# Patient Record
Sex: FEMALE | Race: WHITE | Employment: PART TIME | URBAN - METROPOLITAN AREA
[De-identification: names, ages, dates, MRNs, and addresses within clinical notes are randomized per-mention and may not be internally consistent; named-entity substitution may affect disease eponyms.]

---

## 2017-03-20 ENCOUNTER — HOSPITAL ENCOUNTER (OUTPATIENT)
Dept: VASCULAR LAB | Age: 47
Discharge: HOME | End: 2017-03-20
Attending: FAMILY MEDICINE | Admitting: FAMILY MEDICINE

## 2017-03-20 DIAGNOSIS — M79.89 OTHER SPECIFIED SOFT TISSUE DISORDERS: ICD-10-CM

## 2017-03-28 ENCOUNTER — HOSPITAL ENCOUNTER (OUTPATIENT)
Dept: MAMMOGRAPHY | Age: 47
Discharge: HOME | End: 2017-03-28
Attending: NURSE PRACTITIONER | Admitting: NURSE PRACTITIONER

## 2017-03-28 DIAGNOSIS — Z12.31 ENCOUNTER FOR SCREENING MAMMOGRAM FOR BREAST CANCER: ICD-10-CM

## 2017-08-17 ENCOUNTER — OFFICE VISIT (OUTPATIENT)
Dept: PULMONOLOGY | Age: 47
End: 2017-08-17

## 2017-08-17 VITALS
DIASTOLIC BLOOD PRESSURE: 74 MMHG | HEART RATE: 72 BPM | OXYGEN SATURATION: 97 % | HEIGHT: 65 IN | SYSTOLIC BLOOD PRESSURE: 124 MMHG | BODY MASS INDEX: 48.82 KG/M2 | WEIGHT: 293 LBS

## 2017-08-17 DIAGNOSIS — G47.33 OSA (OBSTRUCTIVE SLEEP APNEA): Primary | ICD-10-CM

## 2017-08-17 PROCEDURE — 99203 OFFICE O/P NEW LOW 30 MIN: CPT | Performed by: INTERNAL MEDICINE

## 2017-08-17 RX ORDER — DIMENHYDRINATE 50 MG
100 TABLET ORAL DAILY
COMMUNITY

## 2017-08-17 RX ORDER — ALBUTEROL SULFATE 90 UG/1
1-2 AEROSOL, METERED RESPIRATORY (INHALATION)
COMMUNITY
Start: 2017-05-01

## 2017-08-17 ASSESSMENT — SLEEP AND FATIGUE QUESTIONNAIRES
HOW LIKELY ARE YOU TO NOD OFF OR FALL ASLEEP WHEN YOU ARE A PASSENGER IN A CAR FOR AN HOUR WITHOUT A BREAK: 1
HOW LIKELY ARE YOU TO NOD OFF OR FALL ASLEEP IN A CAR, WHILE STOPPED FOR A FEW MINUTES IN TRAFFIC: 1
ESS TOTAL SCORE: 18
HOW LIKELY ARE YOU TO NOD OFF OR FALL ASLEEP WHILE SITTING QUIETLY AFTER LUNCH WITHOUT ALCOHOL: 3
HOW LIKELY ARE YOU TO NOD OFF OR FALL ASLEEP WHILE SITTING AND READING: 3
HOW LIKELY ARE YOU TO NOD OFF OR FALL ASLEEP WHILE WATCHING TV: 3
HOW LIKELY ARE YOU TO NOD OFF OR FALL ASLEEP WHILE SITTING INACTIVE IN A PUBLIC PLACE: 3
HOW LIKELY ARE YOU TO NOD OFF OR FALL ASLEEP WHILE LYING DOWN TO REST IN THE AFTERNOON WHEN CIRCUMSTANCES PERMIT: 3
NECK CIRCUMFERENCE (INCHES): 19
HOW LIKELY ARE YOU TO NOD OFF OR FALL ASLEEP WHILE SITTING AND TALKING TO SOMEONE: 1

## 2017-09-23 ENCOUNTER — HOSPITAL ENCOUNTER (OUTPATIENT)
Dept: OTHER | Age: 47
Discharge: HOME | End: 2017-09-23
Attending: NURSE PRACTITIONER | Admitting: NURSE PRACTITIONER

## 2017-09-23 LAB
A/G RATIO: 0.9 (ref 1.1–2.2)
ALBUMIN SERPL-MCNC: 3.8 G/DL (ref 3.4–5)
ALP BLD-CCNC: 85 U/L (ref 40–129)
ALT SERPL-CCNC: 33 U/L (ref 10–40)
ANION GAP SERPL CALCULATED.3IONS-SCNC: 13 MMOL/L (ref 3–16)
AST SERPL-CCNC: 43 U/L (ref 15–37)
BASOPHILS ABSOLUTE: 0.1 K/UL (ref 0–0.2)
BASOPHILS RELATIVE PERCENT: 0.7 %
BILIRUB SERPL-MCNC: 0.4 MG/DL (ref 0–1)
BUN BLDV-MCNC: 16 MG/DL (ref 7–20)
CALCIUM SERPL-MCNC: 9.7 MG/DL (ref 8.3–10.6)
CHLORIDE BLD-SCNC: 100 MMOL/L (ref 99–110)
CHOLESTEROL, FASTING: 133 MG/DL (ref 0–199)
CO2: 26 MMOL/L (ref 21–32)
CREAT SERPL-MCNC: 0.6 MG/DL (ref 0.6–1.1)
EOSINOPHILS ABSOLUTE: 0.2 K/UL (ref 0–0.6)
EOSINOPHILS RELATIVE PERCENT: 1.8 %
GFR AFRICAN AMERICAN: >60
GFR NON-AFRICAN AMERICAN: >60
GLOBULIN: 4.4 G/DL
GLUCOSE FASTING: 156 MG/DL (ref 70–99)
HCT VFR BLD CALC: 42.7 % (ref 36–48)
HDLC SERPL-MCNC: 37 MG/DL (ref 40–60)
HEMOGLOBIN: 13.8 G/DL (ref 12–16)
LDL CHOLESTEROL CALCULATED: 58 MG/DL
LYMPHOCYTES ABSOLUTE: 2.5 K/UL (ref 1–5.1)
LYMPHOCYTES RELATIVE PERCENT: 26.3 %
MCH RBC QN AUTO: 28.9 PG (ref 26–34)
MCHC RBC AUTO-ENTMCNC: 32.3 G/DL (ref 31–36)
MCV RBC AUTO: 89.5 FL (ref 80–100)
MONOCYTES ABSOLUTE: 0.6 K/UL (ref 0–1.3)
MONOCYTES RELATIVE PERCENT: 5.9 %
NEUTROPHILS ABSOLUTE: 6.3 K/UL (ref 1.7–7.7)
NEUTROPHILS RELATIVE PERCENT: 65.3 %
PDW BLD-RTO: 17.3 % (ref 12.4–15.4)
PLATELET # BLD: 263 K/UL (ref 135–450)
PMV BLD AUTO: 9.3 FL (ref 5–10.5)
POTASSIUM SERPL-SCNC: 5.1 MMOL/L (ref 3.5–5.1)
RBC # BLD: 4.77 M/UL (ref 4–5.2)
SODIUM BLD-SCNC: 139 MMOL/L (ref 136–145)
TOTAL PROTEIN: 8.2 G/DL (ref 6.4–8.2)
TRIGLYCERIDE, FASTING: 192 MG/DL (ref 0–150)
TSH REFLEX: 0.48 UIU/ML (ref 0.27–4.2)
VLDLC SERPL CALC-MCNC: 38 MG/DL
WBC # BLD: 9.6 K/UL (ref 4–11)

## 2017-09-24 LAB
ESTIMATED AVERAGE GLUCOSE: 142.7 MG/DL
HBA1C MFR BLD: 6.6 %

## 2017-10-09 ENCOUNTER — HOSPITAL ENCOUNTER (OUTPATIENT)
Dept: SLEEP MEDICINE | Age: 47
Discharge: HOME | End: 2017-10-10
Attending: INTERNAL MEDICINE | Admitting: INTERNAL MEDICINE

## 2017-10-09 DIAGNOSIS — G47.33 OSA (OBSTRUCTIVE SLEEP APNEA): ICD-10-CM

## 2017-10-09 DIAGNOSIS — G47.33 OSA (OBSTRUCTIVE SLEEP APNEA): Primary | ICD-10-CM

## 2017-12-01 ENCOUNTER — OFFICE VISIT (OUTPATIENT)
Dept: PULMONOLOGY | Age: 47
End: 2017-12-01

## 2017-12-01 VITALS
WEIGHT: 293 LBS | DIASTOLIC BLOOD PRESSURE: 80 MMHG | TEMPERATURE: 98.2 F | RESPIRATION RATE: 20 BRPM | SYSTOLIC BLOOD PRESSURE: 124 MMHG | BODY MASS INDEX: 48.82 KG/M2 | OXYGEN SATURATION: 98 % | HEART RATE: 72 BPM | HEIGHT: 65 IN

## 2017-12-01 DIAGNOSIS — E66.01 MORBID OBESITY WITH BMI OF 50.0-59.9, ADULT (HCC): ICD-10-CM

## 2017-12-01 DIAGNOSIS — G47.33 OSA (OBSTRUCTIVE SLEEP APNEA): Primary | ICD-10-CM

## 2017-12-01 DIAGNOSIS — G47.10 HYPERSOMNIA: ICD-10-CM

## 2017-12-01 PROCEDURE — 99213 OFFICE O/P EST LOW 20 MIN: CPT | Performed by: NURSE PRACTITIONER

## 2017-12-01 RX ORDER — DILTIAZEM HYDROCHLORIDE 60 MG/1
TABLET, FILM COATED ORAL
COMMUNITY
Start: 2017-09-26 | End: 2018-10-01 | Stop reason: ALTCHOICE

## 2017-12-01 ASSESSMENT — SLEEP AND FATIGUE QUESTIONNAIRES
HOW LIKELY ARE YOU TO NOD OFF OR FALL ASLEEP WHILE LYING DOWN TO REST IN THE AFTERNOON WHEN CIRCUMSTANCES PERMIT: 3
ESS TOTAL SCORE: 10
HOW LIKELY ARE YOU TO NOD OFF OR FALL ASLEEP WHILE SITTING INACTIVE IN A PUBLIC PLACE: 3
HOW LIKELY ARE YOU TO NOD OFF OR FALL ASLEEP WHILE SITTING AND READING: 1
HOW LIKELY ARE YOU TO NOD OFF OR FALL ASLEEP WHEN YOU ARE A PASSENGER IN A CAR FOR AN HOUR WITHOUT A BREAK: 0
HOW LIKELY ARE YOU TO NOD OFF OR FALL ASLEEP WHILE SITTING QUIETLY AFTER LUNCH WITHOUT ALCOHOL: 1
HOW LIKELY ARE YOU TO NOD OFF OR FALL ASLEEP WHILE SITTING AND TALKING TO SOMEONE: 0
NECK CIRCUMFERENCE (INCHES): 19
HOW LIKELY ARE YOU TO NOD OFF OR FALL ASLEEP WHILE WATCHING TV: 2
HOW LIKELY ARE YOU TO NOD OFF OR FALL ASLEEP IN A CAR, WHILE STOPPED FOR A FEW MINUTES IN TRAFFIC: 0

## 2017-12-01 NOTE — PATIENT INSTRUCTIONS
Uncomfortable bedding can prevent good sleep. Ensure your bedroom is quiet and comfortable. A cooler room along with enough blankets to stay warm is recommended. If your room is too noisy, try a white noise machine. If too bright, try black out shades or an eye mask. Dont take worries to bed. Leave worries about work, school etc. behind you when you go to bed. Some people find it helpful to assign a worry period in the evening or late afternoon to write down your worries and get them out of your system. CPAP Equipment Cleaning and Disinfecting Schedule  Equipment Cleaning Frequency Instructions  Disinfecting Frequency   Non-Disposable Filters  Weekly Mild soapy water, Rinse, Air Dry Not Required   Disposable Filters Change as needed  2-4 weeks Do Not Wash Not Required   Hose/tubing Daily Mild soapy water, Rinse, Air Dry Once a week   Mask / Nasal Pillows Daily Mild soapy water, Rinse, Air Dry Once a week   Headgear Weekly Hand wash, Mild soapy water, Rinse, Dry  Not Required   Humidifier Daily Empty water daily  Mild soapy water, Rinse well, Air Dry  Once a week   CPAP Unit As Needed Dust with damp cloth,  No detergents or sprays Not Required         Disinfect (per schedule) with 1 part white vinegar and 3 parts water- soak mask and water chamber for 30 minutes every 1-2 weeks, more often if sick. Allow water/vinegar mixture to run through tubing. Allow all equipment to air dry. Drying Hints:   Always hang tubing away from direct sunlight, as this will cause the tubing to become yellow, brittle and crack over a period of time. DO NOT attach the wet tubing to your CPAP unit to blow-dry it. The moisture from the tubing can drain back into your machine. Moisture in your unit can cause sudden pressure increases or short circuits  DO's and DON'Ts:  - Don't use alcohol-based products to clean your mask, because it can cause the materials to become hard and brittle.    - Don't put headgear in the washer or dryer  - Don't use any caustic or household cleaning solutions such as bleach on your CPAP   equipment.  - Do follow the recommended cleaning schedule. - Do change your disposable filter frequently. Adapted From: [email or web address]. shtm.   These are general suggestions for all models please follow specific s recommendations and specific instructions

## 2018-06-15 ENCOUNTER — OFFICE VISIT (OUTPATIENT)
Dept: ORTHOPEDIC SURGERY | Age: 48
End: 2018-06-15

## 2018-06-15 VITALS
BODY MASS INDEX: 48.82 KG/M2 | SYSTOLIC BLOOD PRESSURE: 117 MMHG | HEART RATE: 67 BPM | DIASTOLIC BLOOD PRESSURE: 67 MMHG | HEIGHT: 65 IN | WEIGHT: 293 LBS

## 2018-06-15 DIAGNOSIS — M25.512 ACUTE PAIN OF LEFT SHOULDER: Primary | ICD-10-CM

## 2018-06-15 PROCEDURE — 99202 OFFICE O/P NEW SF 15 MIN: CPT | Performed by: ORTHOPAEDIC SURGERY

## 2018-06-15 RX ORDER — ATENOLOL 25 MG/1
TABLET ORAL
COMMUNITY
Start: 2018-03-31 | End: 2018-10-01 | Stop reason: ALTCHOICE

## 2018-06-15 RX ORDER — LISINOPRIL 10 MG/1
TABLET ORAL
COMMUNITY
Start: 2018-03-31 | End: 2018-10-01 | Stop reason: ALTCHOICE

## 2018-06-15 RX ORDER — CEPHALEXIN 500 MG/1
CAPSULE ORAL
COMMUNITY
Start: 2018-04-11 | End: 2018-10-01 | Stop reason: ALTCHOICE

## 2018-06-15 RX ORDER — BUDESONIDE AND FORMOTEROL FUMARATE DIHYDRATE 80; 4.5 UG/1; UG/1
AEROSOL RESPIRATORY (INHALATION)
COMMUNITY
Start: 2018-02-27

## 2018-07-02 ENCOUNTER — OFFICE VISIT (OUTPATIENT)
Dept: ORTHOPEDIC SURGERY | Age: 48
End: 2018-07-02

## 2018-07-02 VITALS
BODY MASS INDEX: 48.82 KG/M2 | HEART RATE: 96 BPM | SYSTOLIC BLOOD PRESSURE: 102 MMHG | WEIGHT: 293 LBS | HEIGHT: 65 IN | DIASTOLIC BLOOD PRESSURE: 75 MMHG

## 2018-07-02 DIAGNOSIS — M75.112 INCOMPLETE TEAR OF LEFT ROTATOR CUFF: Primary | ICD-10-CM

## 2018-07-02 PROCEDURE — 99213 OFFICE O/P EST LOW 20 MIN: CPT | Performed by: ORTHOPAEDIC SURGERY

## 2018-07-02 NOTE — PROGRESS NOTES
History  Hailee Nicholson is a 50 y.o. female who returns for follow-up of her left shoulder MRI study. Symptoms are is unchanged. Pain level today is 5/10. Treatment to date includes diagnostic MRI. Patient's medications, allergies, past medical, surgical, social and family histories were reviewed and updated as appropriate. Review of Systems  Relevant review of systems reviewed and available in the patient's chart    Primary Area of CC: Patient has a difficult time with active abduction of the shoulder due to pain in the subacromial space. She also demonstrated some signs of apprehension concern was raised possibility of a labral pathology as well. MRIs been performed here to review the results    Examination proximal and distal to the injured area show good overall ROM, no bony prominence or soft tissue tenderness, no instability or excessive stiffness. Radiology:     MRI of the left shoulder shows severe coalescing tendinopathy of the supraspinatus tendon with an evolving concealed interstitial tear at the anterior footprint measuring 1.2 x 1.2 cm. Mild insertional tendinosis of the infraspinatus and subscapularis tendon with moderate tendinopathy of the proximal biceps tendon without full-thickness tearing or retraction is present no abnormalities seen in the labrum. Impression   Diagnosis Orders   1. Incomplete tear of left rotator cuff                Plan  Patient remains symptomatic is probably a candidate for a medical rotation flap to treat the incomplete tear of the supraspinatus. I'll refer to Dr. Alonzo Esquivel for further evaluation and treatment recommendations.     [place]

## 2018-07-16 ENCOUNTER — OFFICE VISIT (OUTPATIENT)
Dept: ORTHOPEDIC SURGERY | Age: 48
End: 2018-07-16

## 2018-07-16 VITALS
HEART RATE: 78 BPM | HEIGHT: 65 IN | BODY MASS INDEX: 48.82 KG/M2 | DIASTOLIC BLOOD PRESSURE: 78 MMHG | WEIGHT: 293 LBS | SYSTOLIC BLOOD PRESSURE: 132 MMHG

## 2018-07-16 DIAGNOSIS — M75.122 COMPLETE TEAR OF LEFT ROTATOR CUFF: Primary | ICD-10-CM

## 2018-07-16 PROBLEM — S46.812A TRAPEZIUS MUSCLE STRAIN, LEFT, INITIAL ENCOUNTER: Status: ACTIVE | Noted: 2018-07-16

## 2018-07-16 PROCEDURE — 99214 OFFICE O/P EST MOD 30 MIN: CPT | Performed by: ORTHOPAEDIC SURGERY

## 2018-07-16 PROCEDURE — L3670 SO ACRO/CLAV CAN WEB PRE OTS: HCPCS | Performed by: ORTHOPAEDIC SURGERY

## 2018-07-16 RX ORDER — DICLOFENAC SODIUM 75 MG/1
TABLET, DELAYED RELEASE ORAL
Status: ON HOLD | COMMUNITY
Start: 2018-06-23 | End: 2018-10-03 | Stop reason: HOSPADM

## 2018-07-16 RX ORDER — CLINDAMYCIN HYDROCHLORIDE 300 MG/1
CAPSULE ORAL
COMMUNITY
Start: 2018-06-25 | End: 2018-10-01 | Stop reason: ALTCHOICE

## 2018-08-22 ENCOUNTER — TELEPHONE (OUTPATIENT)
Dept: ORTHOPEDIC SURGERY | Age: 48
End: 2018-08-22

## 2018-08-24 ENCOUNTER — HOSPITAL ENCOUNTER (OUTPATIENT)
Age: 48
Discharge: HOME | End: 2018-08-24
Payer: COMMERCIAL

## 2018-08-24 LAB
A/G RATIO: 1 (ref 1.1–2.2)
ALBUMIN SERPL-MCNC: 3.9 G/DL (ref 3.4–5)
ALP BLD-CCNC: 76 U/L (ref 40–129)
ALT SERPL-CCNC: 24 U/L (ref 10–40)
ANION GAP SERPL CALCULATED.3IONS-SCNC: 15 MMOL/L (ref 3–16)
APTT: 32.7 SEC (ref 26–36)
AST SERPL-CCNC: 30 U/L (ref 15–37)
BASOPHILS ABSOLUTE: 0.1 K/UL (ref 0–0.2)
BASOPHILS RELATIVE PERCENT: 0.7 %
BILIRUB SERPL-MCNC: 0.5 MG/DL (ref 0–1)
BUN BLDV-MCNC: 15 MG/DL (ref 7–20)
CALCIUM SERPL-MCNC: 9.8 MG/DL (ref 8.3–10.6)
CHLORIDE BLD-SCNC: 100 MMOL/L (ref 99–110)
CHOLESTEROL, TOTAL: 133 MG/DL (ref 0–199)
CO2: 25 MMOL/L (ref 21–32)
CREAT SERPL-MCNC: 0.6 MG/DL (ref 0.6–1.1)
EOSINOPHILS ABSOLUTE: 0.1 K/UL (ref 0–0.6)
EOSINOPHILS RELATIVE PERCENT: 1.5 %
GFR AFRICAN AMERICAN: >60
GFR NON-AFRICAN AMERICAN: >60
GLOBULIN: 4 G/DL
GLUCOSE BLD-MCNC: 104 MG/DL (ref 70–99)
HCT VFR BLD CALC: 39.2 % (ref 36–48)
HDLC SERPL-MCNC: 45 MG/DL (ref 40–60)
HEMOGLOBIN: 12.8 G/DL (ref 12–16)
INR BLD: 1.06 (ref 0.86–1.14)
LDL CHOLESTEROL CALCULATED: 54 MG/DL
LYMPHOCYTES ABSOLUTE: 2.8 K/UL (ref 1–5.1)
LYMPHOCYTES RELATIVE PERCENT: 28.8 %
MCH RBC QN AUTO: 28.4 PG (ref 26–34)
MCHC RBC AUTO-ENTMCNC: 32.6 G/DL (ref 31–36)
MCV RBC AUTO: 87.3 FL (ref 80–100)
MONOCYTES ABSOLUTE: 0.5 K/UL (ref 0–1.3)
MONOCYTES RELATIVE PERCENT: 5.3 %
NEUTROPHILS ABSOLUTE: 6.1 K/UL (ref 1.7–7.7)
NEUTROPHILS RELATIVE PERCENT: 63.7 %
PDW BLD-RTO: 16.2 % (ref 12.4–15.4)
PLATELET # BLD: 273 K/UL (ref 135–450)
PMV BLD AUTO: 8.5 FL (ref 5–10.5)
POTASSIUM SERPL-SCNC: 5.1 MMOL/L (ref 3.5–5.1)
PROTHROMBIN TIME: 12.1 SEC (ref 9.8–13)
RBC # BLD: 4.49 M/UL (ref 4–5.2)
SODIUM BLD-SCNC: 140 MMOL/L (ref 136–145)
TOTAL PROTEIN: 7.9 G/DL (ref 6.4–8.2)
TRIGL SERPL-MCNC: 171 MG/DL (ref 0–150)
VLDLC SERPL CALC-MCNC: 34 MG/DL
WBC # BLD: 9.6 K/UL (ref 4–11)

## 2018-08-24 PROCEDURE — 80053 COMPREHEN METABOLIC PANEL: CPT

## 2018-08-24 PROCEDURE — 85610 PROTHROMBIN TIME: CPT

## 2018-08-24 PROCEDURE — 83036 HEMOGLOBIN GLYCOSYLATED A1C: CPT

## 2018-08-24 PROCEDURE — 85730 THROMBOPLASTIN TIME PARTIAL: CPT

## 2018-08-24 PROCEDURE — 36415 COLL VENOUS BLD VENIPUNCTURE: CPT

## 2018-08-24 PROCEDURE — 80061 LIPID PANEL: CPT

## 2018-08-24 PROCEDURE — 85025 COMPLETE CBC W/AUTO DIFF WBC: CPT

## 2018-08-25 LAB
ESTIMATED AVERAGE GLUCOSE: 145.6 MG/DL
HBA1C MFR BLD: 6.7 %

## 2018-09-25 ENCOUNTER — TELEPHONE (OUTPATIENT)
Dept: ORTHOPEDIC SURGERY | Age: 48
End: 2018-09-25

## 2018-10-02 ENCOUNTER — ANESTHESIA EVENT (OUTPATIENT)
Dept: OPERATING ROOM | Age: 48
End: 2018-10-02
Payer: COMMERCIAL

## 2018-10-03 ENCOUNTER — ANESTHESIA (OUTPATIENT)
Dept: OPERATING ROOM | Age: 48
End: 2018-10-03
Payer: COMMERCIAL

## 2018-10-03 ENCOUNTER — HOSPITAL ENCOUNTER (OUTPATIENT)
Age: 48
Setting detail: OUTPATIENT SURGERY
Discharge: HOME | End: 2018-10-03
Attending: ORTHOPAEDIC SURGERY | Admitting: ORTHOPAEDIC SURGERY
Payer: COMMERCIAL

## 2018-10-03 VITALS
BODY MASS INDEX: 48.82 KG/M2 | TEMPERATURE: 98.5 F | OXYGEN SATURATION: 93 % | HEART RATE: 80 BPM | WEIGHT: 293 LBS | DIASTOLIC BLOOD PRESSURE: 74 MMHG | HEIGHT: 65 IN | SYSTOLIC BLOOD PRESSURE: 117 MMHG | RESPIRATION RATE: 16 BRPM

## 2018-10-03 VITALS
OXYGEN SATURATION: 100 % | DIASTOLIC BLOOD PRESSURE: 81 MMHG | SYSTOLIC BLOOD PRESSURE: 105 MMHG | RESPIRATION RATE: 14 BRPM

## 2018-10-03 DIAGNOSIS — M75.112 INCOMPLETE TEAR OF LEFT ROTATOR CUFF: Primary | ICD-10-CM

## 2018-10-03 LAB
GLUCOSE BLD-MCNC: 174 MG/DL (ref 70–99)
GLUCOSE BLD-MCNC: 201 MG/DL (ref 70–99)
PERFORMED ON: ABNORMAL
PERFORMED ON: ABNORMAL

## 2018-10-03 PROCEDURE — 6360000002 HC RX W HCPCS: Performed by: ANESTHESIOLOGY

## 2018-10-03 PROCEDURE — 6360000002 HC RX W HCPCS: Performed by: ORTHOPAEDIC SURGERY

## 2018-10-03 PROCEDURE — 7100000010 HC PHASE II RECOVERY - FIRST 15 MIN: Performed by: ORTHOPAEDIC SURGERY

## 2018-10-03 PROCEDURE — 3700000000 HC ANESTHESIA ATTENDED CARE: Performed by: ORTHOPAEDIC SURGERY

## 2018-10-03 PROCEDURE — 3700000001 HC ADD 15 MINUTES (ANESTHESIA): Performed by: ORTHOPAEDIC SURGERY

## 2018-10-03 PROCEDURE — C1713 ANCHOR/SCREW BN/BN,TIS/BN: HCPCS | Performed by: ORTHOPAEDIC SURGERY

## 2018-10-03 PROCEDURE — 3600000004 HC SURGERY LEVEL 4 BASE: Performed by: ORTHOPAEDIC SURGERY

## 2018-10-03 PROCEDURE — 7100000011 HC PHASE II RECOVERY - ADDTL 15 MIN: Performed by: ORTHOPAEDIC SURGERY

## 2018-10-03 PROCEDURE — 7100000001 HC PACU RECOVERY - ADDTL 15 MIN: Performed by: ORTHOPAEDIC SURGERY

## 2018-10-03 PROCEDURE — 2580000003 HC RX 258: Performed by: ORTHOPAEDIC SURGERY

## 2018-10-03 PROCEDURE — C9290 INJ, BUPIVACAINE LIPOSOME: HCPCS | Performed by: ORTHOPAEDIC SURGERY

## 2018-10-03 PROCEDURE — 93010 ELECTROCARDIOGRAM REPORT: CPT | Performed by: INTERNAL MEDICINE

## 2018-10-03 PROCEDURE — 2580000003 HC RX 258: Performed by: ANESTHESIOLOGY

## 2018-10-03 PROCEDURE — 7100000000 HC PACU RECOVERY - FIRST 15 MIN: Performed by: ORTHOPAEDIC SURGERY

## 2018-10-03 PROCEDURE — 2500000003 HC RX 250 WO HCPCS: Performed by: NURSE ANESTHETIST, CERTIFIED REGISTERED

## 2018-10-03 PROCEDURE — 93005 ELECTROCARDIOGRAM TRACING: CPT | Performed by: ANESTHESIOLOGY

## 2018-10-03 PROCEDURE — 2709999900 HC NON-CHARGEABLE SUPPLY: Performed by: ORTHOPAEDIC SURGERY

## 2018-10-03 PROCEDURE — 2720000010 HC SURG SUPPLY STERILE: Performed by: ORTHOPAEDIC SURGERY

## 2018-10-03 PROCEDURE — 2500000003 HC RX 250 WO HCPCS: Performed by: ORTHOPAEDIC SURGERY

## 2018-10-03 PROCEDURE — 6370000000 HC RX 637 (ALT 250 FOR IP): Performed by: ANESTHESIOLOGY

## 2018-10-03 PROCEDURE — 2500000003 HC RX 250 WO HCPCS: Performed by: ANESTHESIOLOGY

## 2018-10-03 PROCEDURE — 6360000002 HC RX W HCPCS: Performed by: NURSE ANESTHETIST, CERTIFIED REGISTERED

## 2018-10-03 PROCEDURE — 3600000014 HC SURGERY LEVEL 4 ADDTL 15MIN: Performed by: ORTHOPAEDIC SURGERY

## 2018-10-03 DEVICE — IMPLANTABLE DEVICE: Type: IMPLANTABLE DEVICE | Site: SHOULDER | Status: FUNCTIONAL

## 2018-10-03 DEVICE — ANCHOR SUT DIA45MM BIOCRYL RAPIDE ABSRB 3 DYNACORD SZ 2: Type: IMPLANTABLE DEVICE | Site: SHOULDER | Status: FUNCTIONAL

## 2018-10-03 DEVICE — SCREW BNE L18MM DIA4MM PERIARTC S STL ST LOK FULL THRD FOR: Type: IMPLANTABLE DEVICE | Site: SHOULDER | Status: FUNCTIONAL

## 2018-10-03 RX ORDER — OXYCODONE HYDROCHLORIDE 5 MG/1
5 TABLET ORAL ONCE
Status: COMPLETED | OUTPATIENT
Start: 2018-10-03 | End: 2018-10-03

## 2018-10-03 RX ORDER — BUPIVACAINE HYDROCHLORIDE 5 MG/ML
INJECTION, SOLUTION PERINEURAL PRN
Status: DISCONTINUED | OUTPATIENT
Start: 2018-10-03 | End: 2018-10-03 | Stop reason: HOSPADM

## 2018-10-03 RX ORDER — 0.9 % SODIUM CHLORIDE 0.9 %
INTRAVENOUS SOLUTION INTRAVENOUS CONTINUOUS PRN
Status: DISCONTINUED | OUTPATIENT
Start: 2018-10-03 | End: 2018-10-03 | Stop reason: HOSPADM

## 2018-10-03 RX ORDER — FENTANYL CITRATE 50 UG/ML
INJECTION, SOLUTION INTRAMUSCULAR; INTRAVENOUS PRN
Status: DISCONTINUED | OUTPATIENT
Start: 2018-10-03 | End: 2018-10-03 | Stop reason: SDUPTHER

## 2018-10-03 RX ORDER — SUCCINYLCHOLINE CHLORIDE 20 MG/ML
INJECTION INTRAMUSCULAR; INTRAVENOUS PRN
Status: DISCONTINUED | OUTPATIENT
Start: 2018-10-03 | End: 2018-10-03 | Stop reason: SDUPTHER

## 2018-10-03 RX ORDER — SODIUM CHLORIDE 0.9 % (FLUSH) 0.9 %
SYRINGE (ML) INJECTION
Status: DISCONTINUED
Start: 2018-10-03 | End: 2018-10-03 | Stop reason: HOSPADM

## 2018-10-03 RX ORDER — LIDOCAINE HYDROCHLORIDE 10 MG/ML
2 INJECTION, SOLUTION INFILTRATION; PERINEURAL
Status: COMPLETED | OUTPATIENT
Start: 2018-10-03 | End: 2018-10-03

## 2018-10-03 RX ORDER — ACETAMINOPHEN 10 MG/ML
1000 INJECTION, SOLUTION INTRAVENOUS ONCE
Status: COMPLETED | OUTPATIENT
Start: 2018-10-03 | End: 2018-10-03

## 2018-10-03 RX ORDER — MAGNESIUM HYDROXIDE 1200 MG/15ML
LIQUID ORAL CONTINUOUS PRN
Status: DISCONTINUED | OUTPATIENT
Start: 2018-10-03 | End: 2018-10-03 | Stop reason: HOSPADM

## 2018-10-03 RX ORDER — GLYCOPYRROLATE 0.2 MG/ML
INJECTION INTRAMUSCULAR; INTRAVENOUS PRN
Status: DISCONTINUED | OUTPATIENT
Start: 2018-10-03 | End: 2018-10-03 | Stop reason: SDUPTHER

## 2018-10-03 RX ORDER — SODIUM CHLORIDE, SODIUM LACTATE, POTASSIUM CHLORIDE, CALCIUM CHLORIDE 600; 310; 30; 20 MG/100ML; MG/100ML; MG/100ML; MG/100ML
INJECTION, SOLUTION INTRAVENOUS CONTINUOUS
Status: DISCONTINUED | OUTPATIENT
Start: 2018-10-03 | End: 2018-10-03 | Stop reason: HOSPADM

## 2018-10-03 RX ORDER — EPHEDRINE SULFATE 50 MG/ML
INJECTION INTRAVENOUS PRN
Status: DISCONTINUED | OUTPATIENT
Start: 2018-10-03 | End: 2018-10-03 | Stop reason: SDUPTHER

## 2018-10-03 RX ORDER — LABETALOL HYDROCHLORIDE 5 MG/ML
INJECTION, SOLUTION INTRAVENOUS PRN
Status: DISCONTINUED | OUTPATIENT
Start: 2018-10-03 | End: 2018-10-03 | Stop reason: SDUPTHER

## 2018-10-03 RX ORDER — ONDANSETRON 2 MG/ML
4 INJECTION INTRAMUSCULAR; INTRAVENOUS
Status: COMPLETED | OUTPATIENT
Start: 2018-10-03 | End: 2018-10-03

## 2018-10-03 RX ORDER — PROMETHAZINE HYDROCHLORIDE 25 MG/ML
6.25 INJECTION, SOLUTION INTRAMUSCULAR; INTRAVENOUS
Status: DISCONTINUED | OUTPATIENT
Start: 2018-10-03 | End: 2018-10-03 | Stop reason: HOSPADM

## 2018-10-03 RX ORDER — PROPOFOL 10 MG/ML
INJECTION, EMULSION INTRAVENOUS PRN
Status: DISCONTINUED | OUTPATIENT
Start: 2018-10-03 | End: 2018-10-03 | Stop reason: SDUPTHER

## 2018-10-03 RX ORDER — OXYCODONE HYDROCHLORIDE AND ACETAMINOPHEN 5; 325 MG/1; MG/1
1 TABLET ORAL EVERY 6 HOURS PRN
Qty: 28 TABLET | Refills: 0 | Status: SHIPPED | OUTPATIENT
Start: 2018-10-03 | End: 2018-10-10

## 2018-10-03 RX ORDER — HYDROMORPHONE HCL 110MG/55ML
0.5 PATIENT CONTROLLED ANALGESIA SYRINGE INTRAVENOUS EVERY 5 MIN PRN
Status: DISCONTINUED | OUTPATIENT
Start: 2018-10-03 | End: 2018-10-03 | Stop reason: HOSPADM

## 2018-10-03 RX ORDER — ROCURONIUM BROMIDE 10 MG/ML
INJECTION, SOLUTION INTRAVENOUS PRN
Status: DISCONTINUED | OUTPATIENT
Start: 2018-10-03 | End: 2018-10-03 | Stop reason: SDUPTHER

## 2018-10-03 RX ORDER — HYDROMORPHONE HCL 110MG/55ML
0.25 PATIENT CONTROLLED ANALGESIA SYRINGE INTRAVENOUS EVERY 5 MIN PRN
Status: DISCONTINUED | OUTPATIENT
Start: 2018-10-03 | End: 2018-10-03 | Stop reason: HOSPADM

## 2018-10-03 RX ADMIN — Medication 3 G: at 07:32

## 2018-10-03 RX ADMIN — ONDANSETRON 4 MG: 2 INJECTION INTRAMUSCULAR; INTRAVENOUS at 11:25

## 2018-10-03 RX ADMIN — LABETALOL HYDROCHLORIDE 5 MG: 5 INJECTION INTRAVENOUS at 08:44

## 2018-10-03 RX ADMIN — HYDROMORPHONE HYDROCHLORIDE 0.5 MG: 2 INJECTION INTRAMUSCULAR; INTRAVENOUS; SUBCUTANEOUS at 10:07

## 2018-10-03 RX ADMIN — FENTANYL CITRATE 150 MCG: 50 INJECTION, SOLUTION INTRAMUSCULAR; INTRAVENOUS at 08:23

## 2018-10-03 RX ADMIN — SUGAMMADEX 200 MG: 100 INJECTION, SOLUTION INTRAVENOUS at 09:43

## 2018-10-03 RX ADMIN — OXYCODONE HYDROCHLORIDE 5 MG: 5 TABLET ORAL at 11:05

## 2018-10-03 RX ADMIN — HYDROMORPHONE HYDROCHLORIDE 0.5 MG: 2 INJECTION INTRAMUSCULAR; INTRAVENOUS; SUBCUTANEOUS at 10:27

## 2018-10-03 RX ADMIN — LIDOCAINE HYDROCHLORIDE 2 ML: 10 INJECTION, SOLUTION EPIDURAL; INFILTRATION; INTRACAUDAL; PERINEURAL at 06:57

## 2018-10-03 RX ADMIN — FENTANYL CITRATE 100 MCG: 50 INJECTION, SOLUTION INTRAMUSCULAR; INTRAVENOUS at 08:46

## 2018-10-03 RX ADMIN — LABETALOL HYDROCHLORIDE 5 MG: 5 INJECTION INTRAVENOUS at 08:50

## 2018-10-03 RX ADMIN — SUCCINYLCHOLINE CHLORIDE 160 MG: 20 INJECTION, SOLUTION INTRAMUSCULAR; INTRAVENOUS at 07:58

## 2018-10-03 RX ADMIN — LABETALOL HYDROCHLORIDE 5 MG: 5 INJECTION INTRAVENOUS at 08:36

## 2018-10-03 RX ADMIN — EPHEDRINE SULFATE 15 MG: 50 INJECTION INTRAVENOUS at 08:03

## 2018-10-03 RX ADMIN — SODIUM CHLORIDE, POTASSIUM CHLORIDE, SODIUM LACTATE AND CALCIUM CHLORIDE: 600; 310; 30; 20 INJECTION, SOLUTION INTRAVENOUS at 06:56

## 2018-10-03 RX ADMIN — FENTANYL CITRATE 50 MCG: 50 INJECTION, SOLUTION INTRAMUSCULAR; INTRAVENOUS at 09:19

## 2018-10-03 RX ADMIN — HYDROMORPHONE HYDROCHLORIDE 0.5 MG: 2 INJECTION INTRAMUSCULAR; INTRAVENOUS; SUBCUTANEOUS at 10:19

## 2018-10-03 RX ADMIN — FENTANYL CITRATE 100 MCG: 50 INJECTION, SOLUTION INTRAMUSCULAR; INTRAVENOUS at 08:50

## 2018-10-03 RX ADMIN — FENTANYL CITRATE 150 MCG: 50 INJECTION, SOLUTION INTRAMUSCULAR; INTRAVENOUS at 07:55

## 2018-10-03 RX ADMIN — FENTANYL CITRATE 100 MCG: 50 INJECTION, SOLUTION INTRAMUSCULAR; INTRAVENOUS at 08:00

## 2018-10-03 RX ADMIN — PROPOFOL 200 MG: 10 INJECTION, EMULSION INTRAVENOUS at 07:58

## 2018-10-03 RX ADMIN — GLYCOPYRROLATE 0.2 MG: 0.2 INJECTION, SOLUTION INTRAMUSCULAR; INTRAVENOUS at 07:55

## 2018-10-03 RX ADMIN — ACETAMINOPHEN 1000 MG: 10 INJECTION, SOLUTION INTRAVENOUS at 06:57

## 2018-10-03 RX ADMIN — FENTANYL CITRATE 100 MCG: 50 INJECTION, SOLUTION INTRAMUSCULAR; INTRAVENOUS at 08:13

## 2018-10-03 RX ADMIN — ROCURONIUM BROMIDE 50 MG: 10 INJECTION, SOLUTION INTRAVENOUS at 08:00

## 2018-10-03 ASSESSMENT — PULMONARY FUNCTION TESTS
PIF_VALUE: 29
PIF_VALUE: 28
PIF_VALUE: 23
PIF_VALUE: 32
PIF_VALUE: 22
PIF_VALUE: 20
PIF_VALUE: 32
PIF_VALUE: 20
PIF_VALUE: 25
PIF_VALUE: 0
PIF_VALUE: 20
PIF_VALUE: 27
PIF_VALUE: 29
PIF_VALUE: 33
PIF_VALUE: 32
PIF_VALUE: 24
PIF_VALUE: 30
PIF_VALUE: 1
PIF_VALUE: 28
PIF_VALUE: 20
PIF_VALUE: 22
PIF_VALUE: 24
PIF_VALUE: 20
PIF_VALUE: 30
PIF_VALUE: 25
PIF_VALUE: 30
PIF_VALUE: 28
PIF_VALUE: 32
PIF_VALUE: 24
PIF_VALUE: 23
PIF_VALUE: 5
PIF_VALUE: 21
PIF_VALUE: 28
PIF_VALUE: 36
PIF_VALUE: 25
PIF_VALUE: 22
PIF_VALUE: 28
PIF_VALUE: 35
PIF_VALUE: 27
PIF_VALUE: 28
PIF_VALUE: 23
PIF_VALUE: 27
PIF_VALUE: 25
PIF_VALUE: 23
PIF_VALUE: 27
PIF_VALUE: 26
PIF_VALUE: 13
PIF_VALUE: 28
PIF_VALUE: 20
PIF_VALUE: 20
PIF_VALUE: 29
PIF_VALUE: 35
PIF_VALUE: 28
PIF_VALUE: 32
PIF_VALUE: 5
PIF_VALUE: 21
PIF_VALUE: 38
PIF_VALUE: 25
PIF_VALUE: 22
PIF_VALUE: 30
PIF_VALUE: 32
PIF_VALUE: 12
PIF_VALUE: 25
PIF_VALUE: 35
PIF_VALUE: 27
PIF_VALUE: 32
PIF_VALUE: 34
PIF_VALUE: 25
PIF_VALUE: 41
PIF_VALUE: 34
PIF_VALUE: 25
PIF_VALUE: 41
PIF_VALUE: 28
PIF_VALUE: 25
PIF_VALUE: 36
PIF_VALUE: 27
PIF_VALUE: 35
PIF_VALUE: 2
PIF_VALUE: 0
PIF_VALUE: 34
PIF_VALUE: 25
PIF_VALUE: 28
PIF_VALUE: 22
PIF_VALUE: 22
PIF_VALUE: 24
PIF_VALUE: 22
PIF_VALUE: 32
PIF_VALUE: 3
PIF_VALUE: 0
PIF_VALUE: 41
PIF_VALUE: 33
PIF_VALUE: 29
PIF_VALUE: 3
PIF_VALUE: 0
PIF_VALUE: 1
PIF_VALUE: 22
PIF_VALUE: 11
PIF_VALUE: 30
PIF_VALUE: 28
PIF_VALUE: 25
PIF_VALUE: 34
PIF_VALUE: 1
PIF_VALUE: 26
PIF_VALUE: 34
PIF_VALUE: 2
PIF_VALUE: 1
PIF_VALUE: 29
PIF_VALUE: 32
PIF_VALUE: 29
PIF_VALUE: 30
PIF_VALUE: 32
PIF_VALUE: 28
PIF_VALUE: 24
PIF_VALUE: 23
PIF_VALUE: 25
PIF_VALUE: 27
PIF_VALUE: 33
PIF_VALUE: 7
PIF_VALUE: 27
PIF_VALUE: 25
PIF_VALUE: 22
PIF_VALUE: 28
PIF_VALUE: 33
PIF_VALUE: 32
PIF_VALUE: 28
PIF_VALUE: 31
PIF_VALUE: 26
PIF_VALUE: 29
PIF_VALUE: 25
PIF_VALUE: 28

## 2018-10-03 ASSESSMENT — ENCOUNTER SYMPTOMS: SHORTNESS OF BREATH: 1

## 2018-10-03 ASSESSMENT — PAIN DESCRIPTION - FREQUENCY: FREQUENCY: CONTINUOUS

## 2018-10-03 ASSESSMENT — PAIN DESCRIPTION - PAIN TYPE
TYPE: SURGICAL PAIN
TYPE: SURGICAL PAIN

## 2018-10-03 ASSESSMENT — PAIN DESCRIPTION - DESCRIPTORS
DESCRIPTORS: BURNING
DESCRIPTORS: CONSTANT;PRESSURE

## 2018-10-03 ASSESSMENT — PAIN SCALES - GENERAL
PAINLEVEL_OUTOF10: 4
PAINLEVEL_OUTOF10: 5
PAINLEVEL_OUTOF10: 7
PAINLEVEL_OUTOF10: 5
PAINLEVEL_OUTOF10: 5
PAINLEVEL_OUTOF10: 7
PAINLEVEL_OUTOF10: 7
PAINLEVEL_OUTOF10: 0

## 2018-10-03 ASSESSMENT — PAIN DESCRIPTION - LOCATION
LOCATION: SHOULDER
LOCATION: SHOULDER

## 2018-10-03 ASSESSMENT — PAIN DESCRIPTION - ORIENTATION
ORIENTATION: LEFT
ORIENTATION: LEFT

## 2018-10-03 ASSESSMENT — PAIN DESCRIPTION - ONSET: ONSET: ON-GOING

## 2018-10-03 ASSESSMENT — PAIN - FUNCTIONAL ASSESSMENT: PAIN_FUNCTIONAL_ASSESSMENT: 0-10

## 2018-10-03 NOTE — ANESTHESIA PRE PROCEDURE
Department of Anesthesiology  Preprocedure Note       Name:  Juju Beverly   Age:  46 y.o.  :  1970                                          MRN:  [id number]         Date:  10/3/2018      Surgeon: Hansa Evans):  Amari Reilly DO    Procedure: Procedure(s):  SHOULDER ARTHROSCOPY, ROTATOR CUFF REPAIR, POSSIBLE ROTATION MEDICAL GRAFT, SUBACROMIAL DECOMPRESSION, BICEP WORK AS NEEDED    Medications prior to admission:   Prior to Admission medications    Medication Sig Start Date End Date Taking? Authorizing Provider   aspirin 81 MG tablet Take 81 mg by mouth daily   Yes Historical Provider, MD   diclofenac (VOLTAREN) 75 MG EC tablet  18  Yes Historical Provider, MD   ONE TOUCH ULTRA TEST strip  18  Yes Historical Provider, MD   blood glucose test strips (ASCENSIA AUTODISC VI;ONE TOUCH ULTRA TEST VI) strip Check Blood glucose  Twice Daily 4/3/18  Yes Historical Provider, MD   budesonide-formoterol (SYMBICORT) 80-4.5 MCG/ACT AERO USE 2 INHALATIONS ORALLY   TWICE DAILY 18  Yes Historical Provider, MD   Probiotic Product (PROBIOTIC DAILY PO) Take 1 capsule by mouth daily   Yes Historical Provider, MD   Coenzyme Q10 (CO Q-10) 100 MG CAPS Take 100 mg by mouth daily   Yes Historical Provider, MD   OMEPRAZOLE PO Take 20 mg by mouth daily   Yes Historical Provider, MD   albuterol sulfate  (90 Base) MCG/ACT inhaler Inhale 1-2 puffs into the lungs 17  Yes Historical Provider, MD   lisinopril (PRINIVIL;ZESTRIL) 10 MG tablet Take 10 mg by mouth daily   Yes Historical Provider, MD   metFORMIN (GLUCOPHAGE) 1000 MG tablet Take 1,000 mg by mouth 2 times daily (with meals)   Yes Historical Provider, MD   montelukast (SINGULAIR) 10 MG tablet Take 10 mg by mouth nightly. Yes Historical Provider, MD   levocetirizine (XYZAL) 5 MG tablet Take 5 mg by mouth nightly. Yes Historical Provider, MD   atenolol (TENORMIN) 25 MG tablet Take 25 mg by mouth daily.    Yes Historical Provider, MD   Omega-3 Fatty Acids (FISH OIL) 1000 MG CAPS Take 6,000 mg by mouth daily. Yes Historical Provider, MD   Glucosamine-Chondroit-Vit C-Mn (GLUCOSAMINE CHONDR 500 COMPLEX PO) Take 1 tablet by mouth daily. Yes Historical Provider, MD   Pyridoxine HCl (VITAMIN B-6) 100 MG tablet Take 200 mg by mouth daily. Yes Historical Provider, MD   Ascorbic Acid (VITAMIN C) 250 MG tablet Take 1,000 mg by mouth daily    Yes Historical Provider, MD       Current medications:    Current Facility-Administered Medications   Medication Dose Route Frequency Provider Last Rate Last Dose    ceFAZolin (ANCEF) 3 g in dextrose 5 % 100 mL IVPB  3 g Intravenous Once Leti Gallardo DO        lidocaine 1 % injection 2 mL  2 mL Intradermal Once PRN Javan Crenshaw MD        lactated ringers infusion   Intravenous Continuous Javan Crenshaw MD        acetaminophen (OFIRMEV) infusion 1,000 mg  1,000 mg Intravenous Once Lalita Ahn, DO           Allergies:     Allergies   Allergen Reactions    Atorvastatin      Other reaction(s): Muscle Aches       Problem List:    Patient Active Problem List   Diagnosis Code    Ventral hernia with obstruction but no gangrene K43.6    Incomplete tear of left rotator cuff M75.112    Trapezius muscle strain, left, initial encounter C34.753H       Past Medical History:        Diagnosis Date    Acid reflux     Diabetes mellitus ([facility] [address]     Hyperlipidemia     Hypertension     KORY on CPAP        Past Surgical History:        Procedure Laterality Date    ARM SURGERY      L arm    CHOLECYSTECTOMY      HERNIA REPAIR      HYSTERECTOMY      OTHER SURGICAL HISTORY Left     surgery on left elbow    VENTRAL HERNIA REPAIR N/A 9/4/14    REPAIR OF INCARCERATED VENTRAL HERNIA       Social History:    Social History   Substance Use Topics    Smoking status: Former Smoker     Packs/day: 1.00     Years: 33.00     Quit date: 9/1/2014    Smokeless tobacco: Never Used    Alcohol use No

## 2018-10-03 NOTE — H&P
History and physical performed and in paper chart. I have reviewed with the patient and/or family the risks, benefits, and alternatives to the procedure.       Kadi Bustos,   10/3/2018

## 2018-10-03 NOTE — OP NOTE
Diagnostic Shoulder Arthroscopy with Rotator Cuff Repair     Savi Mays (1970)    [facility]#:  [id number]    Date of Surgery- 10/3/2018      Preoperative Diagnosis-  1. Rotator cuff tear left shoulder           2. Outlet impingement left shoulder                                                      Postoperative Diagnosis-  1. Rotator cuff tear left shoulder           2. Outlet impingement left shoulder           Procedure-   1. Diagnostic arthroscopy left shoulder (CPT 59438)            2. Arthroscopic rotator cuff repair left shoulder (TTI-91942)    3. Subacromial decompression (CPT- 95658)          Surgeon-  Mia Dobson DO    [facility] [address], [place]    Anesthesia- Scalene and General    EBL: minimal    Implants- Depuy Mitek Healix 4.5 mm, quantity # 1; Healix Knotless, quantity # 2    Indications for Operation  Persistent shoulder pain with an MRI confirmed rotator cuff tear. The patient chose to proceed with the aforementioned procedures. At no time were any guarantees implied or stated. Site Marking and Surgical Prep  The patient was seen in the holding area and the appropriate extremity marked with a pen. Interscalene block was administered by the anesthesia department. The patient was taken to the operative suite, identified, placed on the operating room table in the supine position. After induction of general anesthesia, the patient was placed in the beach chair position with all bony prominences adequately padded and the head and neck anatomically supported. Examination  Under Anesthesia. Full symmetric range of motion, no instability    Diagnostic Arthroscopy  The upper extremity from the neck to fingertips was prepped with Hibiclens and alcohol and draped in the standard fashion. The forearm was well padded and secured in the [employer] Spider extremity positioning device. A standard midglenoid posterior portal was established. The trocar and cannula were inserted. The trocar was removed and the arthroscope and inflow inserted. Systematic arthroscopy was performed and the findings are summarized below. Standard anterior portal was made with outside in technique. 1.  Superior Labrum/Biceps Tendon-  Intact biceps tendon. No evidence of labral fraying  2. Anterior/Posterior Labrum- Intact without fraying  3. Rotator Cuff- The subscapularis tendon was intact. There was a full thickness tear of the supraspinatus tendon  4. Inferior Axillary Recess-  No loose bodies  5. Articular Surfaces-  Intact    Intra-articular Debridement   None performed. Subacromial Decompression  The trocar and cannula were redirected to the subacromial space. The arthroscope and inflow were inserted. A lateral portal was established after localizing the subacromial space with a spinal needle. Using a both a shaver and a radiofrequency probe, the bursa and the tissue beneath the acromion were removed. The coracromial ligament was divided. A 5.5 mm bur was placed through the lateral portal and the anterior aspect of the acromion was removed. The arthroscope was then switched to the lateral portal and with the bur posteriorly, a smooth flat acromion was created. The arm was abducted to 90 degrees and internally and externally rotated. There was no evidence of outlet impingement. Rotator Cuff Repair  Hypertrophic bursa was debrided. The supraspinatus tendon tear was exposed and the configuration identified. The tendon was mobilized to achieve a tension free repair. The greater tuberosity was abraded to a bleeding bony surface. One medial row anchor was placed. Sutures were retrieved in a horizontal fashion using a retrograde technique. Suture limbs were isolated and tied using a [employer] knot with three half hitches. Two Healix Knotless anchors were place laterally to create a transosseus equivalent repair. The repair was under minimal tension.   The arm was abducted, internally

## 2018-10-04 LAB
EKG ATRIAL RATE: 76 BPM
EKG DIAGNOSIS: NORMAL
EKG P AXIS: 12 DEGREES
EKG P-R INTERVAL: 160 MS
EKG Q-T INTERVAL: 370 MS
EKG QRS DURATION: 86 MS
EKG QTC CALCULATION (BAZETT): 416 MS
EKG R AXIS: 55 DEGREES
EKG T AXIS: 47 DEGREES
EKG VENTRICULAR RATE: 76 BPM

## 2018-10-11 ENCOUNTER — HOSPITAL ENCOUNTER (OUTPATIENT)
Dept: PHYSICAL THERAPY | Age: 48
Setting detail: THERAPIES SERIES
Discharge: HOME | End: 2018-10-11
Payer: COMMERCIAL

## 2018-10-11 ENCOUNTER — OFFICE VISIT (OUTPATIENT)
Dept: ORTHOPEDIC SURGERY | Age: 48
End: 2018-10-11

## 2018-10-11 VITALS — HEIGHT: 65 IN | WEIGHT: 293 LBS | BODY MASS INDEX: 48.82 KG/M2

## 2018-10-11 DIAGNOSIS — Z98.890 S/P ROTATOR CUFF REPAIR: Primary | ICD-10-CM

## 2018-10-11 PROCEDURE — G8985 CARRY GOAL STATUS: HCPCS

## 2018-10-11 PROCEDURE — 99024 POSTOP FOLLOW-UP VISIT: CPT | Performed by: ORTHOPAEDIC SURGERY

## 2018-10-11 PROCEDURE — 97140 MANUAL THERAPY 1/> REGIONS: CPT

## 2018-10-11 PROCEDURE — 97161 PT EVAL LOW COMPLEX 20 MIN: CPT

## 2018-10-11 PROCEDURE — G8984 CARRY CURRENT STATUS: HCPCS

## 2018-10-11 PROCEDURE — 97110 THERAPEUTIC EXERCISES: CPT

## 2018-10-11 NOTE — FLOWSHEET NOTE
[address] [place] [address] and [facility]    Physical Therapy Daily Treatment Note  Date:  10/11/2018    Patient Name:  Nunzio Fleischer    :  1970  MRN: [id number]  Restrictions/Precautions:    Medical/Treatment Diagnosis Information:  · Diagnosis: Z98.890 s/p RTC repair, M25.512 L shoulder pain  · Treatment Diagnosis: M25.512 L shoulder pain, s/p L RTC repair   Insurance/Certification information:   [place]  Physician Information:  Referring Practitioner: Dr. Anna Del Rio of care signed (Y/N): routed    Date of Patient follow up with Physician:     G-Code (if applicable):      Date G-Code Applied:  10/11/18  PT G-Codes  Functional Assessment Tool Used: Quick DASH  Score: 48%  Functional Limitation: Carrying, moving and handling objects  Carrying, Moving and Handling Objects Current Status (): At least 40 percent but less than 60 percent impaired, limited or restricted  Carrying, Moving and Handling Objects Goal Status ():  At least 20 percent but less than 40 percent impaired, limited or restricted    Progress Note: [x]  Yes  []  No  Next due by: Visit #10       Latex Allergy:  [x]NO      []YES  Preferred Language for Healthcare:   [x]English       []other:    Visit # Insurance Allowable   1 30     Pain level:  3/10     SUBJECTIVE:  See eval    OBJECTIVE: See eval  Observation:    ABD pillow not donned appropriate, repositioned pillow  Test measurements:      RESTRICTIONS/PRECAUTIONS: PROM only x6 wks, ultrasling x3 wks, sling x3 wks    Exercises/Interventions:     Therapeutic Ex Sets/sec Reps Notes HEP   Shrugs 1 30  x   Scap squeeze 2 10  x   Elbow flex/ext 1 30  x   Ball squeeze   Throughout day x   Self assisted flex 5\" 10  x   Self assisted ER 5\" 10  x   UT S 10\" 5  x                                                           Pt edu: HEP, dx, POC, ice       Manual Intervention       PROM x8'                                         NMR re-education swelling/inflammation/restriction, improving soft tissue extensibility and allowing for proper ROM for normal function with self care, mobility, lifting and ambulation. Modalities: declined ice     Charges:  Timed Code Treatment Minutes: 30   Total Treatment Minutes: 45     [x] EVAL  [x] ZH(65579) x  1   [] IONTO  [] NMR (26864) x      [] VASO  [x] Manual (97411) x  1    [] Other:  [] TA x       [] Mech Traction (78952)  [] ES(attended) (25420)      [] ES (un) (14959):     GOALS:   Patient stated goal: reach above my head     Therapist goals for Patient:   Short Term Goals: To be achieved in: 2 weeks  1. Independent in HEP and progression per patient tolerance, in order to prevent re-injury. 2. Patient will have a decrease in pain to facilitate improvement in movement, function, and ADLs as indicated by Functional Deficits.     Long Term Goals: To be achieved in: 12 weeks  1. Disability index score of 20% or less for the DASH to assist with reaching prior level of function. 2. Patient will demonstrate increased AROM to WNL to allow for proper joint functioning as indicated by patients Functional Deficits. 3. Patient will demonstrate an increase in Strength to >4+/5 to allow for proper functional mobility as indicated by patients Functional Deficits. 4. Patient will return to all functional activities without increased symptoms or restriction. 5. Pt will D/C sling and exhibit normal use of UE(patient specific functional goal)        Progression Towards Functional goals:  [] Patient is progressing as expected towards functional goals listed. [] Progression is slowed due to complexities listed. [] Progression has been slowed due to co-morbidities.   [x] Plan just implemented, too soon to assess goals progression  [] Other:     ASSESSMENT:  See eval    Treatment/Activity Tolerance:  [x] Patient tolerated treatment well [] Patient limited by fatique  [] Patient limited by pain  [] Patient limited by

## 2018-10-11 NOTE — PLAN OF CARE
[facility], [address] [place] [address] [place], [address]  Phone: [phone]   Fax:     [phone]                                                       Physical Therapy Certification    Dear Referring Practitioner: Dr. Surya Alex,    We had the pleasure of evaluating the following patient for physical therapy services at [address] [place] [address]. A summary of our findings can be found in the initial assessment below. This includes our plan of care. If you have any questions or concerns regarding these findings, please do not hesitate to contact me at the office phone number checked above. Thank you for the referral.       Physician Signature:_______________________________Date:__________________  By signing above (or electronic signature), therapists plan is approved by physician      Patient: Dakotah Shirley   : 1970   MRN: [id number]  Referring Physician: Referring Practitioner: Dr. Surya Alex      Evaluation Date: 10/11/2018      Medical Diagnosis Information:  Diagnosis: Z98.890 s/p RTC repair, M25.512 L shoulder pain   Treatment Diagnosis: M25.512 L shoulder pain, s/p L RTC repair 10/3/18                                         Insurance information:  [place]    Precautions/ Contra-indications: PROM only x6 wks, sling x6 wks  Latex Allergy:  [x]NO      []YES  Preferred Language for Healthcare:   [x]English       []other:    SUBJECTIVE: Patient stated complaint: Pt reports pain started approx 9 mo ago for unknown reasons. Pt reports she didn't go to the MD until her pain was really bad. PCP referred to Dr. Surya Alex. Pt had an MRI that revealed an RTC tear. Pt is s/p L RTC repair, SAD on 10/3/18. Pt reports her pain is relatively low and she is not taking any pain medication, only Tylenol at night prn. Pt is RHD.      Relevant Medical History:(-) latex allergy, (-) adhesive sensitivity, (-) pacemaker, (-) metal/electrical implants, (+) heart history: HBP (controlled with meds), (-) CA, (-) stroke, (-) seizure, (+) diabetes: II controlled with meds, (-) asthma or SOB  Functional Disability Index:PT G-Codes  Functional Assessment Tool Used: Quick DASH  Score: 48%  Functional Limitation: Carrying, moving and handling objects  Carrying, Moving and Handling Objects Current Status (): At least 40 percent but less than 60 percent impaired, limited or restricted  Carrying, Moving and Handling Objects Goal Status (): At least 20 percent but less than 40 percent impaired, limited or restricted    Pain Scale: 2-6/10  Easing factors: tylenol, rest  Provocative factors: no movement allowed     Type: [x]Constant   []Intermittent  []Radiating []Localized []other:     Numbness/Tingling: denies    Occupation/School: [employer], full duty, currently off. Living Status/Prior Level of Function: Independent with ADLs and IADLs,     OBJECTIVE:     CERV ROM     Cervical Flexion     Cervical Extension     Cervical SB     Cervical rotation          ROM Left Right   Shoulder Flex P: 90 °  WNL   Shoulder Abd NT WNL   Shoulder ER P: 40 °  WNL   Shoulder IR NT WNL                  Strength  Left Right   Shoulder Flex NT 4+/5   Shoulder Scap NT 4+/5   Shoulder ER NT 4+/5   Shoulder IR NT 4+/5               Reflexes/Sensation:    [x]Dermatomes/Myotomes intact    [x]Reflexes equal and normal bilaterally   []Other:    Joint mobility: NT   []Normal    []Hypo   []Hyper    Palpation: gross TTP L shoulder    Functional Mobility/Transfers: some limitation with supine to/from sit due to inability     Posture: rounded shoulders    Bandages/Dressings/Incisions: x5 portal sites covered with steri strips    Gait: (include devices/WB status): WNL    Orthopedic Special Tests: NT                       [x] Patient history, allergies, meds reviewed. Medical chart reviewed. See intake form.      Review Of Systems (ROS):  [x]Performed Review of Objects Current Status (): At least 40 percent but less than 60 percent impaired, limited or restricted  Carrying, Moving and Handling Objects Goal Status (): At least 20 percent but less than 40 percent impaired, limited or restricted    ASSESSMENT:   Functional Impairments   [x]Noted spinal or UE joint hypomobility   []Noted spinal or UE joint hypermobility   [x]Decreased UE functional ROM   [x]Decreased UE functional strength   []Abnormal reflexes/sensation/myotomal/dermatomal deficits   [x]Decreased RC/scapular/core strength and neuromuscular control   []other:      Functional Activity Limitations (from functional questionnaire and intake)   [x]Reduced ability to tolerate prolonged functional positions   [x]Reduced ability or difficulty with changes of positions or transfers between positions   [x]Reduced ability to maintain good posture and demonstrate good body mechanics with sitting, bending, and lifting   [x] Reduced ability or tolerance with driving and/or computer work   [x]Reduced ability to sleep   [x]Reduced ability to perform lifting, reaching, carrying tasks   [x]Reduced ability to tolerate impact through UE   [x]Reduced ability to reach behind back   [x]Reduced ability to  or hold objects   [x]Reduced ability to throw or toss an object   []other:    Participation Restrictions   [x]Reduced participation in self care activities   [x]Reduced participation in home management activities   [x]Reduced participation in work activities   [x]Reduced participation in social activities. [x]Reduced participation in sport/recreation activities. Classification:   [x]Signs/symptoms consistent with post-surgical status including decreased ROM, strength and function.   []Signs/symptoms consistent with joint sprain/strain   []Signs/symptoms consistent with shoulder impingement   []Signs/symptoms consistent with shoulder/elbow/wrist tendinopathy   []Signs/symptoms consistent with Rotator cuff [x] Manual therapy as indicated for shoulder, scapula and spine to include: Dry Needling/IASTM, STM, PROM, Gr I-IV mobilizations, manipulation. [x] Modalities as needed that may include: thermal agents, E-stim, Biofeedback, US, iontophoresis as indicated  [x] Patient education on joint protection, postural re-education, activity modification, progression of HEP. HEP instruction: Pt provided with handout and demonstrated and verbalized understanding. (see scanned forms)    GOALS:  Patient stated goal: reach above my head    Therapist goals for Patient:   Short Term Goals: To be achieved in: 2 weeks  1. Independent in HEP and progression per patient tolerance, in order to prevent re-injury. 2. Patient will have a decrease in pain to facilitate improvement in movement, function, and ADLs as indicated by Functional Deficits. Long Term Goals: To be achieved in: 12 weeks  1. Disability index score of 20% or less for the DASH to assist with reaching prior level of function. 2. Patient will demonstrate increased AROM to WNL to allow for proper joint functioning as indicated by patients Functional Deficits. 3. Patient will demonstrate an increase in Strength to >4+/5 to allow for proper functional mobility as indicated by patients Functional Deficits. 4. Patient will return to all functional activities without increased symptoms or restriction.    5. Pt will D/C sling and exhibit normal use of UE(patient specific functional goal)       Electronically signed by:  Marilyn Marinelli PT

## 2018-10-18 ENCOUNTER — HOSPITAL ENCOUNTER (OUTPATIENT)
Dept: PHYSICAL THERAPY | Age: 48
Setting detail: THERAPIES SERIES
Discharge: HOME | End: 2018-10-18
Payer: COMMERCIAL

## 2018-10-18 PROCEDURE — 97140 MANUAL THERAPY 1/> REGIONS: CPT

## 2018-10-18 PROCEDURE — 97110 THERAPEUTIC EXERCISES: CPT

## 2018-10-23 ENCOUNTER — TELEPHONE (OUTPATIENT)
Dept: ORTHOPEDIC SURGERY | Age: 48
End: 2018-10-23

## 2018-10-25 ENCOUNTER — APPOINTMENT (OUTPATIENT)
Dept: PHYSICAL THERAPY | Age: 48
End: 2018-10-25
Payer: COMMERCIAL

## 2018-11-01 ENCOUNTER — HOSPITAL ENCOUNTER (OUTPATIENT)
Dept: PHYSICAL THERAPY | Age: 48
Setting detail: THERAPIES SERIES
Discharge: HOME | End: 2018-11-01
Payer: COMMERCIAL

## 2018-11-01 PROCEDURE — 97140 MANUAL THERAPY 1/> REGIONS: CPT

## 2018-11-01 PROCEDURE — 97110 THERAPEUTIC EXERCISES: CPT

## 2018-11-01 NOTE — FLOWSHEET NOTE
NMR re-education                                                                          Therapeutic Exercise and NMR EXR  [x] (92368) Provided verbal/tactile cueing for activities related to strengthening, flexibility, endurance, ROM for improvements in LE, proximal hip, and core control with self care, mobility, lifting, ambulation.  [] (95799) Provided verbal/tactile cueing for activities related to improving balance, coordination, kinesthetic sense, posture, motor skill, proprioception  to assist with LE, proximal hip, and core control in self care, mobility, lifting, ambulation and eccentric single leg control.      NMR and Therapeutic Activities:    [] (61000 or 31477) Provided verbal/tactile cueing for activities related to improving balance, coordination, kinesthetic sense, posture, motor skill, proprioception and motor activation to allow for proper function of core, proximal hip and LE with self care and ADLs  [] (97306) Gait Re-education- Provided training and instruction to the patient for proper LE, core and proximal hip recruitment and positioning and eccentric body weight control with ambulation re-education including up and down stairs     Home Exercise Program:    [x] (89530) Reviewed/Progressed HEP activities related to strengthening, flexibility, endurance, ROM of core, proximal hip and LE for functional self-care, mobility, lifting and ambulation/stair navigation   [] (19372)Reviewed/Progressed HEP activities related to improving balance, coordination, kinesthetic sense, posture, motor skill, proprioception of core, proximal hip and LE for self care, mobility, lifting, and ambulation/stair navigation      Manual Treatments:  PROM / STM / Oscillations-Mobs:  G-I, II, III, IV (PA's, Inf., Post.)  [x] (34594) Provided manual therapy to mobilize LE, proximal hip and/or LS spine soft tissue/joints for the purpose of modulating pain, promoting relaxation,  increasing ROM,

## 2018-11-08 ENCOUNTER — HOSPITAL ENCOUNTER (OUTPATIENT)
Dept: PHYSICAL THERAPY | Age: 48
Setting detail: THERAPIES SERIES
Discharge: HOME | End: 2018-11-08
Payer: COMMERCIAL

## 2018-11-08 PROCEDURE — 97140 MANUAL THERAPY 1/> REGIONS: CPT

## 2018-11-08 PROCEDURE — 97110 THERAPEUTIC EXERCISES: CPT

## 2018-11-08 NOTE — FLOWSHEET NOTE
flexibility, endurance, ROM for improvements in LE, proximal hip, and core control with self care, mobility, lifting, ambulation.  [] (18763) Provided verbal/tactile cueing for activities related to improving balance, coordination, kinesthetic sense, posture, motor skill, proprioception  to assist with LE, proximal hip, and core control in self care, mobility, lifting, ambulation and eccentric single leg control. NMR and Therapeutic Activities:    [] (64567 or 52478) Provided verbal/tactile cueing for activities related to improving balance, coordination, kinesthetic sense, posture, motor skill, proprioception and motor activation to allow for proper function of core, proximal hip and LE with self care and ADLs  [] (73668) Gait Re-education- Provided training and instruction to the patient for proper LE, core and proximal hip recruitment and positioning and eccentric body weight control with ambulation re-education including up and down stairs     Home Exercise Program:    [x] (43758) Reviewed/Progressed HEP activities related to strengthening, flexibility, endurance, ROM of core, proximal hip and LE for functional self-care, mobility, lifting and ambulation/stair navigation   [] (05928)Reviewed/Progressed HEP activities related to improving balance, coordination, kinesthetic sense, posture, motor skill, proprioception of core, proximal hip and LE for self care, mobility, lifting, and ambulation/stair navigation      Manual Treatments:  PROM / STM / Oscillations-Mobs:  G-I, II, III, IV (PA's, Inf., Post.)  [x] (25159) Provided manual therapy to mobilize LE, proximal hip and/or LS spine soft tissue/joints for the purpose of modulating pain, promoting relaxation,  increasing ROM, reducing/eliminating soft tissue swelling/inflammation/restriction, improving soft tissue extensibility and allowing for proper ROM for normal function with self care, mobility, lifting and ambulation.      Modalities: declined ice

## 2018-11-15 ENCOUNTER — HOSPITAL ENCOUNTER (OUTPATIENT)
Dept: PHYSICAL THERAPY | Age: 48
Setting detail: THERAPIES SERIES
Discharge: HOME | End: 2018-11-15
Payer: COMMERCIAL

## 2018-11-15 PROCEDURE — 97110 THERAPEUTIC EXERCISES: CPT

## 2018-11-15 PROCEDURE — 97140 MANUAL THERAPY 1/> REGIONS: CPT

## 2018-11-15 NOTE — FLOWSHEET NOTE
endurance, ROM for improvements in LE, proximal hip, and core control with self care, mobility, lifting, ambulation.  [] (09567) Provided verbal/tactile cueing for activities related to improving balance, coordination, kinesthetic sense, posture, motor skill, proprioception  to assist with LE, proximal hip, and core control in self care, mobility, lifting, ambulation and eccentric single leg control. NMR and Therapeutic Activities:    [] (47058 or 07160) Provided verbal/tactile cueing for activities related to improving balance, coordination, kinesthetic sense, posture, motor skill, proprioception and motor activation to allow for proper function of core, proximal hip and LE with self care and ADLs  [] (50235) Gait Re-education- Provided training and instruction to the patient for proper LE, core and proximal hip recruitment and positioning and eccentric body weight control with ambulation re-education including up and down stairs     Home Exercise Program:    [x] (67131) Reviewed/Progressed HEP activities related to strengthening, flexibility, endurance, ROM of core, proximal hip and LE for functional self-care, mobility, lifting and ambulation/stair navigation   [] (07107)Reviewed/Progressed HEP activities related to improving balance, coordination, kinesthetic sense, posture, motor skill, proprioception of core, proximal hip and LE for self care, mobility, lifting, and ambulation/stair navigation      Manual Treatments:  PROM / STM / Oscillations-Mobs:  G-I, II, III, IV (PA's, Inf., Post.)  [x] (57788) Provided manual therapy to mobilize LE, proximal hip and/or LS spine soft tissue/joints for the purpose of modulating pain, promoting relaxation,  increasing ROM, reducing/eliminating soft tissue swelling/inflammation/restriction, improving soft tissue extensibility and allowing for proper ROM for normal function with self care, mobility, lifting and ambulation.      Modalities: declined ice     Charges:  Timed

## 2018-11-19 ENCOUNTER — OFFICE VISIT (OUTPATIENT)
Dept: ORTHOPEDIC SURGERY | Age: 48
End: 2018-11-19

## 2018-11-19 VITALS
DIASTOLIC BLOOD PRESSURE: 74 MMHG | WEIGHT: 293 LBS | SYSTOLIC BLOOD PRESSURE: 138 MMHG | BODY MASS INDEX: 48.82 KG/M2 | HEART RATE: 84 BPM | HEIGHT: 65 IN

## 2018-11-19 DIAGNOSIS — Z98.890 S/P ROTATOR CUFF REPAIR: Primary | ICD-10-CM

## 2018-11-19 PROCEDURE — 99024 POSTOP FOLLOW-UP VISIT: CPT | Performed by: ORTHOPAEDIC SURGERY

## 2018-11-21 ENCOUNTER — HOSPITAL ENCOUNTER (OUTPATIENT)
Dept: PHYSICAL THERAPY | Age: 48
Setting detail: THERAPIES SERIES
Discharge: HOME | End: 2018-11-21
Payer: COMMERCIAL

## 2018-11-21 PROCEDURE — 97140 MANUAL THERAPY 1/> REGIONS: CPT

## 2018-11-21 PROCEDURE — 97110 THERAPEUTIC EXERCISES: CPT

## 2018-11-21 NOTE — FLOWSHEET NOTE
well [] Patient limited by fatique  [] Patient limited by pain  [] Patient limited by other medical complications  [] Other:     Prognosis: [x] Good [] Fair  [] Poor    Patient Requires Follow-up: [x] Yes  [] No    PLAN: See eval  [x] Continue per plan of care [] Alter current plan (see comments)  [] Plan of care initiated [] Hold pending MD visit [] Discharge    Electronically signed by: Bg Oropeza PT

## 2018-11-29 ENCOUNTER — HOSPITAL ENCOUNTER (OUTPATIENT)
Dept: PHYSICAL THERAPY | Age: 48
Setting detail: THERAPIES SERIES
Discharge: HOME | End: 2018-11-29
Payer: COMMERCIAL

## 2018-11-29 PROCEDURE — 97110 THERAPEUTIC EXERCISES: CPT

## 2018-11-29 PROCEDURE — 97140 MANUAL THERAPY 1/> REGIONS: CPT

## 2018-12-04 ENCOUNTER — OFFICE VISIT (OUTPATIENT)
Dept: PULMONOLOGY | Age: 48
End: 2018-12-04
Payer: COMMERCIAL

## 2018-12-04 VITALS
HEART RATE: 90 BPM | HEIGHT: 65 IN | WEIGHT: 293 LBS | BODY MASS INDEX: 48.82 KG/M2 | DIASTOLIC BLOOD PRESSURE: 82 MMHG | RESPIRATION RATE: 18 BRPM | SYSTOLIC BLOOD PRESSURE: 124 MMHG | OXYGEN SATURATION: 94 %

## 2018-12-04 DIAGNOSIS — G47.10 HYPERSOMNIA: ICD-10-CM

## 2018-12-04 DIAGNOSIS — G47.33 OSA (OBSTRUCTIVE SLEEP APNEA): Primary | ICD-10-CM

## 2018-12-04 DIAGNOSIS — E66.01 MORBID OBESITY WITH BMI OF 50.0-59.9, ADULT (HCC): ICD-10-CM

## 2018-12-04 PROCEDURE — 99213 OFFICE O/P EST LOW 20 MIN: CPT | Performed by: NURSE PRACTITIONER

## 2018-12-04 ASSESSMENT — SLEEP AND FATIGUE QUESTIONNAIRES
NECK CIRCUMFERENCE (INCHES): 18.5
ESS TOTAL SCORE: 11
HOW LIKELY ARE YOU TO NOD OFF OR FALL ASLEEP WHILE SITTING AND READING: 1
HOW LIKELY ARE YOU TO NOD OFF OR FALL ASLEEP WHILE SITTING QUIETLY AFTER LUNCH WITHOUT ALCOHOL: 3
HOW LIKELY ARE YOU TO NOD OFF OR FALL ASLEEP WHILE SITTING INACTIVE IN A PUBLIC PLACE: 1
HOW LIKELY ARE YOU TO NOD OFF OR FALL ASLEEP IN A CAR, WHILE STOPPED FOR A FEW MINUTES IN TRAFFIC: 0
HOW LIKELY ARE YOU TO NOD OFF OR FALL ASLEEP WHILE SITTING AND READING: 1
HOW LIKELY ARE YOU TO NOD OFF OR FALL ASLEEP WHILE SITTING AND TALKING TO SOMEONE: 1
HOW LIKELY ARE YOU TO NOD OFF OR FALL ASLEEP WHILE WATCHING TV: 2
HOW LIKELY ARE YOU TO NOD OFF OR FALL ASLEEP WHEN YOU ARE A PASSENGER IN A CAR FOR AN HOUR WITHOUT A BREAK: 0
HOW LIKELY ARE YOU TO NOD OFF OR FALL ASLEEP WHILE LYING DOWN TO REST IN THE AFTERNOON WHEN CIRCUMSTANCES PERMIT: 3
HOW LIKELY ARE YOU TO NOD OFF OR FALL ASLEEP WHILE SITTING QUIETLY AFTER LUNCH WITHOUT ALCOHOL: 3
HOW LIKELY ARE YOU TO NOD OFF OR FALL ASLEEP WHILE LYING DOWN TO REST IN THE AFTERNOON WHEN CIRCUMSTANCES PERMIT: 3
HOW LIKELY ARE YOU TO NOD OFF OR FALL ASLEEP WHILE WATCHING TV: 2
HOW LIKELY ARE YOU TO NOD OFF OR FALL ASLEEP WHILE SITTING AND TALKING TO SOMEONE: 1
HOW LIKELY ARE YOU TO NOD OFF OR FALL ASLEEP WHEN YOU ARE A PASSENGER IN A CAR FOR AN HOUR WITHOUT A BREAK: 0
ESS TOTAL SCORE: 11
HOW LIKELY ARE YOU TO NOD OFF OR FALL ASLEEP WHILE SITTING INACTIVE IN A PUBLIC PLACE: 1
HOW LIKELY ARE YOU TO NOD OFF OR FALL ASLEEP IN A CAR, WHILE STOPPED FOR A FEW MINUTES IN TRAFFIC: 0

## 2018-12-04 NOTE — PROGRESS NOTES
[facility] Pulmonary, Critical Care and Sleep Specialist        Patient ID: Emi Haile is a 46 y.o. female who is being seen today for   Chief Complaint   Patient presents with    1 Year Follow Up    Sleep Apnea         HPI:     Eim Haile is a 46 y.o. female in office for KORY follow up. Patient is using BiPAP 7-8 hrs/night with good benefit. Using humidifier. No snoring on CPAP. +leak. The pressure is well tolerated. The nasal pillow mask is comfortable. No significant daytime sleepiness. No nodding off when driving. No dry nose or throat. No fatigue. Bedtime is 11pm-1am and rise time is 7 am, sometimes at 415/515 if she has her grandkids. Sleep onset is 10 minutes. Wakes up 1-2 times at night total. 1nocturia. It takes a few minutes to fall back a sleep. 1 nap during the day for 10-15 minutes, feels refreshed when waking. Rare headache in am. No caffienated beverages during the day. No alcohol. ESS is 11. Interested in bariatric surgery, hoping for a referral to [facility] as she does not want to drive on the highway.       Sleep Medicine 12/4/2018 12/4/2018 12/1/2017 8/17/2017   Sitting and reading 1 1 1 3   Watching TV 2 2 2 3   Sitting, inactive in a public place (e.g. a theatre or a meeting) 1 1 3 3   As a passenger in a car for an hour without a break 0 0 0 1   Lying down to rest in the afternoon when circumstances permit 3 3 3 3   Sitting and talking to someone 1 1 0 1   Sitting quietly after a lunch without alcohol 3 3 1 3   In a car, while stopped for a few minutes in traffic 0 0 0 1   Total score 11 11 10 18   Neck circumference - 18.5 19 19         Past Medical History:  Past Medical History:   Diagnosis Date    Acid reflux     Diabetes mellitus ([address]     Hyperlipidemia     Hypertension     KORY on CPAP        Past Surgical History:        Procedure Laterality Date    ARM SURGERY      L arm    CHOLECYSTECTOMY      HERNIA REPAIR      HYSTERECTOMY      OTHER SURGICAL HISTORY Left     surgery on left elbow    [place] SHLDR ARTHROSCOP,SURG,W/ROTAT CUFF REPR Left 10/3/2018    SHOULDER ARTHROSCOPY, ROTATOR CUFF REPAIR, SUBACROMIAL DECOMPRESSION performed by Zunilda Hoyt DO at [place] [address] ARTHROSCOPY Left 10/03/2018    ROTATOR CUFF REPAIR, ROTATION MEDICAL GRAFT, SUBACROMIAL DECOMPRESSION     VENTRAL HERNIA REPAIR N/A 9/4/14    REPAIR OF INCARCERATED VENTRAL HERNIA       Allergies:  is allergic to atorvastatin. Social History:    TOBACCO:   reports that she quit smoking about 4 years ago. She has a 33.00 pack-year smoking history. She has never used smokeless tobacco.  ETOH:   reports that she does not drink alcohol. Family History:   History reviewed. No pertinent family history. Current Medications:    Current Outpatient Prescriptions:     aspirin 81 MG tablet, Take 81 mg by mouth daily, Disp: , Rfl:     ONE TOUCH ULTRA TEST strip, , Disp: , Rfl:     blood glucose test strips (ASCENSIA AUTODISC VI;ONE TOUCH ULTRA TEST VI) strip, Check Blood glucose  Twice Daily, Disp: , Rfl:     budesonide-formoterol (SYMBICORT) 80-4.5 MCG/ACT AERO, USE 2 INHALATIONS ORALLY   TWICE DAILY, Disp: , Rfl:     Probiotic Product (PROBIOTIC DAILY PO), Take 1 capsule by mouth daily, Disp: , Rfl:     Coenzyme Q10 (CO Q-10) 100 MG CAPS, Take 100 mg by mouth daily, Disp: , Rfl:     OMEPRAZOLE PO, Take 20 mg by mouth daily, Disp: , Rfl:     albuterol sulfate  (90 Base) MCG/ACT inhaler, Inhale 1-2 puffs into the lungs, Disp: , Rfl:     lisinopril (PRINIVIL;ZESTRIL) 10 MG tablet, Take 10 mg by mouth daily, Disp: , Rfl:     metFORMIN (GLUCOPHAGE) 1000 MG tablet, Take 1,000 mg by mouth 2 times daily (with meals), Disp: , Rfl:     montelukast (SINGULAIR) 10 MG tablet, Take 10 mg by mouth nightly., Disp: , Rfl:     levocetirizine (XYZAL) 5 MG tablet, Take 5 mg by mouth nightly., Disp: , Rfl:     atenolol (TENORMIN) 25 MG tablet, Take 25 mg by mouth daily. , Disp: , Rfl:     Omega-3 Fatty Acids (FISH OIL)

## 2018-12-06 ENCOUNTER — HOSPITAL ENCOUNTER (OUTPATIENT)
Dept: PHYSICAL THERAPY | Age: 48
Setting detail: THERAPIES SERIES
Discharge: HOME | End: 2018-12-06
Payer: COMMERCIAL

## 2018-12-06 PROCEDURE — 97140 MANUAL THERAPY 1/> REGIONS: CPT

## 2018-12-06 PROCEDURE — 97110 THERAPEUTIC EXERCISES: CPT

## 2018-12-06 NOTE — FLOWSHEET NOTE
strengthening, flexibility, endurance, ROM for improvements in LE, proximal hip, and core control with self care, mobility, lifting, ambulation.  [] (14822) Provided verbal/tactile cueing for activities related to improving balance, coordination, kinesthetic sense, posture, motor skill, proprioception  to assist with LE, proximal hip, and core control in self care, mobility, lifting, ambulation and eccentric single leg control. NMR and Therapeutic Activities:    [] (13776 or 01306) Provided verbal/tactile cueing for activities related to improving balance, coordination, kinesthetic sense, posture, motor skill, proprioception and motor activation to allow for proper function of core, proximal hip and LE with self care and ADLs  [] (56666) Gait Re-education- Provided training and instruction to the patient for proper LE, core and proximal hip recruitment and positioning and eccentric body weight control with ambulation re-education including up and down stairs     Home Exercise Program:    [x] (53886) Reviewed/Progressed HEP activities related to strengthening, flexibility, endurance, ROM of core, proximal hip and LE for functional self-care, mobility, lifting and ambulation/stair navigation   [] (32626)Reviewed/Progressed HEP activities related to improving balance, coordination, kinesthetic sense, posture, motor skill, proprioception of core, proximal hip and LE for self care, mobility, lifting, and ambulation/stair navigation      Manual Treatments:  PROM / STM / Oscillations-Mobs:  G-I, II, III, IV (PA's, Inf., Post.)  [x] (65474) Provided manual therapy to mobilize LE, proximal hip and/or LS spine soft tissue/joints for the purpose of modulating pain, promoting relaxation,  increasing ROM, reducing/eliminating soft tissue swelling/inflammation/restriction, improving soft tissue extensibility and allowing for proper ROM for normal function with self care, mobility, lifting and ambulation.      Modalities:

## 2018-12-13 ENCOUNTER — HOSPITAL ENCOUNTER (OUTPATIENT)
Dept: PHYSICAL THERAPY | Age: 48
Setting detail: THERAPIES SERIES
Discharge: HOME | End: 2018-12-13
Payer: COMMERCIAL

## 2018-12-13 PROCEDURE — 97110 THERAPEUTIC EXERCISES: CPT

## 2018-12-13 PROCEDURE — 97140 MANUAL THERAPY 1/> REGIONS: CPT

## 2018-12-13 NOTE — FLOWSHEET NOTE
Requires Follow-up: [x] Yes  [] No    PLAN: See eval  [x] Continue per plan of care [] Alter current plan (see comments)  [] Plan of care initiated [] Hold pending MD visit [] Discharge    Electronically signed by: Kianna Valencia PT

## 2018-12-20 ENCOUNTER — HOSPITAL ENCOUNTER (OUTPATIENT)
Dept: PHYSICAL THERAPY | Age: 48
Setting detail: THERAPIES SERIES
Discharge: HOME | End: 2018-12-20
Payer: COMMERCIAL

## 2018-12-20 PROCEDURE — 97140 MANUAL THERAPY 1/> REGIONS: CPT

## 2018-12-20 PROCEDURE — 97110 THERAPEUTIC EXERCISES: CPT

## 2018-12-20 NOTE — FLOWSHEET NOTE
Provided verbal/tactile cueing for activities related to strengthening, flexibility, endurance, ROM for improvements in LE, proximal hip, and core control with self care, mobility, lifting, ambulation.  [] (41046) Provided verbal/tactile cueing for activities related to improving balance, coordination, kinesthetic sense, posture, motor skill, proprioception  to assist with LE, proximal hip, and core control in self care, mobility, lifting, ambulation and eccentric single leg control.      NMR and Therapeutic Activities:    [] (30013 or 95234) Provided verbal/tactile cueing for activities related to improving balance, coordination, kinesthetic sense, posture, motor skill, proprioception and motor activation to allow for proper function of core, proximal hip and LE with self care and ADLs  [] (04426) Gait Re-education- Provided training and instruction to the patient for proper LE, core and proximal hip recruitment and positioning and eccentric body weight control with ambulation re-education including up and down stairs     Home Exercise Program:    [x] (20130) Reviewed/Progressed HEP activities related to strengthening, flexibility, endurance, ROM of core, proximal hip and LE for functional self-care, mobility, lifting and ambulation/stair navigation   [] (79108)Reviewed/Progressed HEP activities related to improving balance, coordination, kinesthetic sense, posture, motor skill, proprioception of core, proximal hip and LE for self care, mobility, lifting, and ambulation/stair navigation      Manual Treatments:  PROM / STM / Oscillations-Mobs:  G-I, II, III, IV (PA's, Inf., Post.)  [x] (31160) Provided manual therapy to mobilize LE, proximal hip and/or LS spine soft tissue/joints for the purpose of modulating pain, promoting relaxation,  increasing ROM, reducing/eliminating soft tissue swelling/inflammation/restriction, improving soft tissue extensibility and allowing for proper ROM for normal function with self

## 2018-12-27 ENCOUNTER — HOSPITAL ENCOUNTER (OUTPATIENT)
Dept: PHYSICAL THERAPY | Age: 48
Setting detail: THERAPIES SERIES
Discharge: HOME | End: 2018-12-27
Payer: COMMERCIAL

## 2018-12-27 PROCEDURE — 97140 MANUAL THERAPY 1/> REGIONS: CPT

## 2018-12-27 PROCEDURE — 97110 THERAPEUTIC EXERCISES: CPT

## 2019-01-03 ENCOUNTER — HOSPITAL ENCOUNTER (OUTPATIENT)
Dept: PHYSICAL THERAPY | Age: 49
Setting detail: THERAPIES SERIES
Discharge: HOME | End: 2019-01-03
Payer: COMMERCIAL

## 2019-01-03 ENCOUNTER — OFFICE VISIT (OUTPATIENT)
Dept: ORTHOPEDIC SURGERY | Age: 49
End: 2019-01-03

## 2019-01-03 VITALS — HEIGHT: 65 IN | WEIGHT: 293 LBS | BODY MASS INDEX: 48.82 KG/M2

## 2019-01-03 DIAGNOSIS — Z98.890 S/P ROTATOR CUFF REPAIR: Primary | ICD-10-CM

## 2019-01-03 PROCEDURE — 97110 THERAPEUTIC EXERCISES: CPT

## 2019-01-03 PROCEDURE — G8984 CARRY CURRENT STATUS: HCPCS

## 2019-01-03 PROCEDURE — 97140 MANUAL THERAPY 1/> REGIONS: CPT

## 2019-01-03 PROCEDURE — 99024 POSTOP FOLLOW-UP VISIT: CPT | Performed by: ORTHOPAEDIC SURGERY

## 2019-01-03 PROCEDURE — G8985 CARRY GOAL STATUS: HCPCS

## 2019-01-03 PROCEDURE — G8986 CARRY D/C STATUS: HCPCS

## 2019-04-04 ENCOUNTER — HOSPITAL ENCOUNTER (OUTPATIENT)
Dept: MAMMOGRAPHY | Age: 49
Discharge: HOME | End: 2019-04-04
Payer: COMMERCIAL

## 2019-04-04 DIAGNOSIS — Z12.31 SCREENING MAMMOGRAM, ENCOUNTER FOR: ICD-10-CM

## 2019-04-04 PROCEDURE — 77067 SCR MAMMO BI INCL CAD: CPT

## 2019-04-25 ENCOUNTER — TELEPHONE (OUTPATIENT)
Dept: PULMONOLOGY | Age: 49
End: 2019-04-25

## 2019-04-25 NOTE — TELEPHONE ENCOUNTER
[facility] called stating that insurance is requiring a letter of medical necessity for pt. That pt is using and benefiting machine, and also that CPAP has been tried and failed. [facility] from [facility] called stating that generally the DME will do the LMN, but pt has an out of state insurance plan, which requires office to submit the LMN. Maurice Karen is [phone]. Need to call [facility] to get fax # to send LMN. [facility] also would like a copy of LMN faxed to [facility] at [phone].

## 2019-05-06 NOTE — TELEPHONE ENCOUNTER
Spoke with Sammi from [employer], she states patient's insurance is not paying for supplies or her BIPAP, Sammi states that patient's insurance keeps telling her they need more information or that it was sent to the wrong place. States she can try to get information to do a peer to peer and will call back with that information. Will wait on LMN until hear back from [facility]. Please send last office note to [place].

## 2019-07-03 NOTE — TELEPHONE ENCOUNTER
Spoke to  Adali @ "[employer]. She stated Sammi was waiting for a call back from the pt. @ the end of June. Left voice message requesting patient to please return  call to  [address]. To let our office know if there is anything further we can help with.

## 2019-07-18 NOTE — TELEPHONE ENCOUNTER
Left voice message requesting patient to please return call to office.  And returned call to Yasmani Marc per Nadya Seek from re  Pt not returning several messages, please advise

## 2019-07-18 NOTE — TELEPHONE ENCOUNTER
Pt returned call to office, instructed to call [employer] to see what they need, or have forms faxed to our office, or what we need to help her with so she can get supplies. Stated she works odd hours, but will try to call Tatyana Salazar [employer]. and keep us informed.

## 2019-08-16 ENCOUNTER — HOSPITAL ENCOUNTER (OUTPATIENT)
Age: 49
Discharge: HOME | End: 2019-08-16
Payer: COMMERCIAL

## 2019-08-16 LAB
CHOLESTEROL, TOTAL: 115 MG/DL (ref 0–199)
HDLC SERPL-MCNC: 33 MG/DL (ref 40–60)
LDL CHOLESTEROL CALCULATED: 40 MG/DL
TRIGL SERPL-MCNC: 208 MG/DL (ref 0–150)
VITAMIN D 25-HYDROXY: 39.2 NG/ML
VLDLC SERPL CALC-MCNC: 42 MG/DL

## 2019-08-16 PROCEDURE — 36415 COLL VENOUS BLD VENIPUNCTURE: CPT

## 2019-08-16 PROCEDURE — 83036 HEMOGLOBIN GLYCOSYLATED A1C: CPT

## 2019-08-16 PROCEDURE — 82306 VITAMIN D 25 HYDROXY: CPT

## 2019-08-16 PROCEDURE — 80061 LIPID PANEL: CPT

## 2019-08-17 LAB
ESTIMATED AVERAGE GLUCOSE: 159.9 MG/DL
HBA1C MFR BLD: 7.2 %

## 2019-12-09 ENCOUNTER — OFFICE VISIT (OUTPATIENT)
Dept: PULMONOLOGY | Age: 49
End: 2019-12-09
Payer: COMMERCIAL

## 2019-12-09 VITALS
HEART RATE: 91 BPM | SYSTOLIC BLOOD PRESSURE: 114 MMHG | DIASTOLIC BLOOD PRESSURE: 77 MMHG | OXYGEN SATURATION: 97 % | HEIGHT: 65 IN | RESPIRATION RATE: 16 BRPM | TEMPERATURE: 98.6 F | WEIGHT: 293 LBS | BODY MASS INDEX: 48.82 KG/M2

## 2019-12-09 DIAGNOSIS — F51.04 PSYCHOPHYSIOLOGICAL INSOMNIA: ICD-10-CM

## 2019-12-09 DIAGNOSIS — G47.33 SEVERE OBSTRUCTIVE SLEEP APNEA: Primary | ICD-10-CM

## 2019-12-09 DIAGNOSIS — E66.01 OBESITY, MORBID, BMI 40.0-49.9 (HCC): ICD-10-CM

## 2019-12-09 DIAGNOSIS — Z71.89 ENCOUNTER FOR BIPAP USE COUNSELING: ICD-10-CM

## 2019-12-09 PROCEDURE — 99214 OFFICE O/P EST MOD 30 MIN: CPT | Performed by: NURSE PRACTITIONER

## 2019-12-09 ASSESSMENT — SLEEP AND FATIGUE QUESTIONNAIRES
HOW LIKELY ARE YOU TO NOD OFF OR FALL ASLEEP IN A CAR, WHILE STOPPED FOR A FEW MINUTES IN TRAFFIC: 0
HOW LIKELY ARE YOU TO NOD OFF OR FALL ASLEEP WHEN YOU ARE A PASSENGER IN A CAR FOR AN HOUR WITHOUT A BREAK: 0
NECK CIRCUMFERENCE (INCHES): 18.5
HOW LIKELY ARE YOU TO NOD OFF OR FALL ASLEEP WHILE SITTING AND TALKING TO SOMEONE: 0
HOW LIKELY ARE YOU TO NOD OFF OR FALL ASLEEP WHILE WATCHING TV: 0
HOW LIKELY ARE YOU TO NOD OFF OR FALL ASLEEP WHILE SITTING INACTIVE IN A PUBLIC PLACE: 0
HOW LIKELY ARE YOU TO NOD OFF OR FALL ASLEEP WHILE SITTING AND READING: 2
HOW LIKELY ARE YOU TO NOD OFF OR FALL ASLEEP WHILE SITTING QUIETLY AFTER LUNCH WITHOUT ALCOHOL: 0
HOW LIKELY ARE YOU TO NOD OFF OR FALL ASLEEP WHILE LYING DOWN TO REST IN THE AFTERNOON WHEN CIRCUMSTANCES PERMIT: 3
ESS TOTAL SCORE: 5

## 2020-01-30 ENCOUNTER — TELEPHONE (OUTPATIENT)
Dept: PULMONOLOGY | Age: 50
End: 2020-01-30

## 2020-02-15 ENCOUNTER — HOSPITAL ENCOUNTER (OUTPATIENT)
Age: 50
Discharge: HOME | End: 2020-02-15
Payer: COMMERCIAL

## 2020-02-15 PROCEDURE — 82550 ASSAY OF CK (CPK): CPT

## 2020-02-15 PROCEDURE — 83550 IRON BINDING TEST: CPT

## 2020-02-15 PROCEDURE — 84443 ASSAY THYROID STIM HORMONE: CPT

## 2020-02-15 PROCEDURE — 83540 ASSAY OF IRON: CPT

## 2020-02-15 PROCEDURE — 85025 COMPLETE CBC W/AUTO DIFF WBC: CPT

## 2020-02-15 PROCEDURE — 82728 ASSAY OF FERRITIN: CPT

## 2020-02-15 PROCEDURE — 36415 COLL VENOUS BLD VENIPUNCTURE: CPT

## 2020-02-15 PROCEDURE — 80053 COMPREHEN METABOLIC PANEL: CPT

## 2020-02-15 PROCEDURE — 83735 ASSAY OF MAGNESIUM: CPT

## 2020-02-15 PROCEDURE — 82306 VITAMIN D 25 HYDROXY: CPT

## 2020-02-15 PROCEDURE — 80061 LIPID PANEL: CPT

## 2020-02-16 LAB
A/G RATIO: 1 (ref 1.1–2.2)
ALBUMIN SERPL-MCNC: 4 G/DL (ref 3.4–5)
ALP BLD-CCNC: 75 U/L (ref 40–129)
ALT SERPL-CCNC: 20 U/L (ref 10–40)
ANION GAP SERPL CALCULATED.3IONS-SCNC: 14 MMOL/L (ref 3–16)
AST SERPL-CCNC: 22 U/L (ref 15–37)
BASOPHILS ABSOLUTE: 0.1 K/UL (ref 0–0.2)
BASOPHILS RELATIVE PERCENT: 1.1 %
BILIRUB SERPL-MCNC: 0.6 MG/DL (ref 0–1)
BUN BLDV-MCNC: 13 MG/DL (ref 7–20)
CALCIUM SERPL-MCNC: 9.6 MG/DL (ref 8.3–10.6)
CHLORIDE BLD-SCNC: 102 MMOL/L (ref 99–110)
CHOLESTEROL, TOTAL: 150 MG/DL (ref 0–199)
CO2: 25 MMOL/L (ref 21–32)
CREAT SERPL-MCNC: 0.7 MG/DL (ref 0.6–1.1)
EOSINOPHILS ABSOLUTE: 0.1 K/UL (ref 0–0.6)
EOSINOPHILS RELATIVE PERCENT: 1.1 %
FERRITIN: 42.6 NG/ML (ref 15–150)
GFR AFRICAN AMERICAN: >60
GFR NON-AFRICAN AMERICAN: >60
GLOBULIN: 4 G/DL
GLUCOSE BLD-MCNC: 111 MG/DL (ref 70–99)
HCT VFR BLD CALC: 47.4 % (ref 36–48)
HDLC SERPL-MCNC: 41 MG/DL (ref 40–60)
HEMOGLOBIN: 14.3 G/DL (ref 12–16)
IRON SATURATION: 10 % (ref 15–50)
IRON: 36 UG/DL (ref 37–145)
LDL CHOLESTEROL CALCULATED: 82 MG/DL
LYMPHOCYTES ABSOLUTE: 2.6 K/UL (ref 1–5.1)
LYMPHOCYTES RELATIVE PERCENT: 28.4 %
MAGNESIUM: 1.6 MG/DL (ref 1.8–2.4)
MCH RBC QN AUTO: 27 PG (ref 26–34)
MCHC RBC AUTO-ENTMCNC: 30.2 G/DL (ref 31–36)
MCV RBC AUTO: 89.6 FL (ref 80–100)
MONOCYTES ABSOLUTE: 0.7 K/UL (ref 0–1.3)
MONOCYTES RELATIVE PERCENT: 7.1 %
NEUTROPHILS ABSOLUTE: 5.8 K/UL (ref 1.7–7.7)
NEUTROPHILS RELATIVE PERCENT: 62.3 %
PDW BLD-RTO: 19.1 % (ref 12.4–15.4)
PLATELET # BLD: 181 K/UL (ref 135–450)
PMV BLD AUTO: 9 FL (ref 5–10.5)
POTASSIUM SERPL-SCNC: 5.3 MMOL/L (ref 3.5–5.1)
RBC # BLD: 5.29 M/UL (ref 4–5.2)
REASON FOR REJECTION: NORMAL
REJECTED TEST: NORMAL
SODIUM BLD-SCNC: 141 MMOL/L (ref 136–145)
TOTAL CK: 142 U/L (ref 26–192)
TOTAL IRON BINDING CAPACITY: 348 UG/DL (ref 260–445)
TOTAL PROTEIN: 8 G/DL (ref 6.4–8.2)
TRIGL SERPL-MCNC: 136 MG/DL (ref 0–150)
TSH REFLEX: 0.34 UIU/ML (ref 0.27–4.2)
VITAMIN D 25-HYDROXY: 22.9 NG/ML
VLDLC SERPL CALC-MCNC: 27 MG/DL
WBC # BLD: 9.3 K/UL (ref 4–11)

## 2020-02-17 ENCOUNTER — HOSPITAL ENCOUNTER (OUTPATIENT)
Age: 50
Discharge: HOME | End: 2020-02-17
Payer: COMMERCIAL

## 2020-02-17 LAB — SEDIMENTATION RATE, ERYTHROCYTE: 55 MM/HR (ref 0–30)

## 2020-02-17 PROCEDURE — 85652 RBC SED RATE AUTOMATED: CPT

## 2020-03-04 ENCOUNTER — HOSPITAL ENCOUNTER (OUTPATIENT)
Dept: CT IMAGING | Age: 50
Discharge: HOME | End: 2020-03-04
Payer: COMMERCIAL

## 2020-03-04 PROCEDURE — 6360000004 HC RX CONTRAST MEDICATION: Performed by: NURSE PRACTITIONER

## 2020-03-04 PROCEDURE — 71270 CT THORAX DX C-/C+: CPT

## 2020-03-04 RX ADMIN — IOPAMIDOL 75 ML: 755 INJECTION, SOLUTION INTRAVENOUS at 17:34

## 2020-03-16 ENCOUNTER — TELEPHONE (OUTPATIENT)
Dept: CARDIOLOGY CLINIC | Age: 50
End: 2020-03-16

## 2020-03-16 NOTE — TELEPHONE ENCOUNTER
Message left for patient to call back. When she calls back let her know [facility] will not be in the office Thursday 3/19/2020. See if she can be see VSP at 12:30 on Thursday if she would like.

## 2020-03-19 ENCOUNTER — OFFICE VISIT (OUTPATIENT)
Dept: CARDIOLOGY CLINIC | Age: 50
End: 2020-03-19
Payer: COMMERCIAL

## 2020-03-19 VITALS
OXYGEN SATURATION: 98 % | HEART RATE: 80 BPM | HEIGHT: 65 IN | DIASTOLIC BLOOD PRESSURE: 82 MMHG | SYSTOLIC BLOOD PRESSURE: 132 MMHG | WEIGHT: 288 LBS | BODY MASS INDEX: 47.98 KG/M2

## 2020-03-19 PROBLEM — I27.20 PULMONARY HYPERTENSION (HCC): Status: ACTIVE | Noted: 2020-03-19

## 2020-03-19 PROBLEM — I25.10 CORONARY ARTERY DISEASE INVOLVING NATIVE CORONARY ARTERY OF NATIVE HEART WITHOUT ANGINA PECTORIS: Status: ACTIVE | Noted: 2020-03-19

## 2020-03-19 PROBLEM — R06.02 SOB (SHORTNESS OF BREATH): Status: ACTIVE | Noted: 2020-03-19

## 2020-03-19 PROCEDURE — 99244 OFF/OP CNSLTJ NEW/EST MOD 40: CPT | Performed by: INTERNAL MEDICINE

## 2020-03-19 RX ORDER — LANOLIN ALCOHOL/MO/W.PET/CERES
3 CREAM (GRAM) TOPICAL DAILY
COMMUNITY
End: 2020-06-15 | Stop reason: ALTCHOICE

## 2020-03-19 RX ORDER — MULTIVITAMIN WITH IRON
250 TABLET ORAL DAILY
COMMUNITY

## 2020-03-19 RX ORDER — FERROUS SULFATE 325(65) MG
325 TABLET ORAL
COMMUNITY

## 2020-03-19 NOTE — PROGRESS NOTES
[address]   Cardiac Consultation    Referring Provider:  ROBBI Shah CNP     Chief Complaint   Patient presents with    New Patient     abnormal CT. denies cardiac symptoms        History of Present Illness:   Nita Carter is a NEW patient for interventional cardiology, referred by ROBBI Shah CNP for an abnormal CT of the chest showing coronary artery disease, and pulmonary hypertension. Today she states she lost weight and her PCP ordered a CT of the chest to rule out cancer. She has SOB with exertion that is unchanged over several years. No assoc chest pain. 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 She does feel SOB when she gets to the top. She rest and feels better. She has a cough that started after she was prescribed lisinopril. No SOB when she lays down at night. She has sleep apnea and wears CPAP nightly. She feels that this helps her sleep. She smokes daily. Patient currently denies any weight gain, edema, palpitations, chest pain, dizziness, and syncope.he has had muscle aches on Lipitor. Past Medical History:   has a past medical history of Acid reflux, Diabetes mellitus ([facility] [address] Hyperlipidemia, Hypertension, and KORY treated with BiPAP. Surgical History:   has a past surgical history that includes Hysterectomy; hernia repair; Cholecystectomy; other surgical history (Left); ventral hernia repair (N/A, 9/4/14); Arm Surgery; Shoulder arthroscopy (Left, 10/03/2018); and pr shldr arthroscop,surg,w/rotat cuff repr (Left, 10/3/2018). Social History:   reports that she has been smoking cigarettes. She has a 33.00 pack-year smoking history. She has never used smokeless tobacco. She reports that she does not drink alcohol or use drugs. Family History:  family history is not on file.      Home Medications:  Prior to Admission medications    Medication Sig Start Date End Date Taking? Authorizing Provider   magnesium (MAGNESIUM-OXIDE) 250 MG TABS tablet Take 250 mg by mouth daily   Yes Historical Provider, MD   ferrous sulfate (IRON 325) 325 (65 Fe) MG tablet Take 325 mg by mouth daily (with breakfast)   Yes Historical Provider, MD   ESOMEPRAZOLE MAGNESIUM PO Take by mouth   Yes Historical Provider, MD   DICLOFENAC PO Take by mouth   Yes Historical Provider, MD   melatonin 3 MG TABS tablet Take 3 mg by mouth daily   Yes Historical Provider, MD   aspirin 81 MG tablet Take 81 mg by mouth daily   Yes Historical Provider, MD   ONE TOUCH ULTRA TEST strip  4/4/18  Yes Historical Provider, MD   blood glucose test strips (ASCENSIA AUTODISC VI;ONE TOUCH ULTRA TEST VI) strip Check Blood glucose  Twice Daily 4/3/18  Yes Historical Provider, MD   budesonide-formoterol (SYMBICORT) 80-4.5 MCG/ACT AERO USE 2 INHALATIONS ORALLY   TWICE DAILY 2/27/18  Yes Historical Provider, MD   Probiotic Product (PROBIOTIC DAILY PO) Take 1 capsule by mouth daily   Yes Historical Provider, MD   Coenzyme Q10 (CO Q-10) 100 MG CAPS Take 100 mg by mouth daily   Yes Historical Provider, MD   albuterol sulfate  (90 Base) MCG/ACT inhaler Inhale 1-2 puffs into the lungs 5/1/17  Yes Historical Provider, MD   lisinopril (PRINIVIL;ZESTRIL) 10 MG tablet Take 10 mg by mouth daily   Yes Historical Provider, MD   metFORMIN (GLUCOPHAGE) 1000 MG tablet Take 1,000 mg by mouth 2 times daily (with meals)   Yes Historical Provider, MD   montelukast (SINGULAIR) 10 MG tablet Take 10 mg by mouth nightly. Yes Historical Provider, MD   levocetirizine (XYZAL) 5 MG tablet Take 5 mg by mouth nightly. Yes Historical Provider, MD   atenolol (TENORMIN) 25 MG tablet Take 25 mg by mouth daily. Yes Historical Provider, MD   Omega-3 Fatty Acids (FISH OIL) 1000 MG CAPS Take 6,000 mg by mouth daily. Yes Historical Provider, MD   Glucosamine-Chondroit-Vit C-Mn (GLUCOSAMINE CHONDR 500 COMPLEX PO) Take 1 tablet by mouth daily.    Yes Historical Provider, MD   Pyridoxine HCl (VITAMIN B-6) 100 MG tablet Take 200 mg by mouth daily. Yes Historical Provider, MD   Ascorbic Acid (VITAMIN C) 250 MG tablet Take 1,000 mg by mouth daily    Yes Historical Provider, MD        Allergies:  Atorvastatin     Review of Systems:   · Constitutional: there has been no unanticipated weight loss. There's been no change in energy level, sleep pattern, or activity level. · Eyes: No visual changes or diplopia. No scleral icterus. · ENT: No Headaches, hearing loss or vertigo. No mouth sores or sore throat. · Cardiovascular: Reviewed in HPI  · Respiratory: No cough or wheezing, no sputum production. No hematemesis. · Gastrointestinal: No abdominal pain, appetite loss, blood in stools. No change in bowel or bladder habits. · Genitourinary: No dysuria, trouble voiding, or hematuria. · Musculoskeletal:  No gait disturbance, weakness or joint complaints. · Integumentary: No rash or pruritis. · Neurological: No headache, diplopia, change in muscle strength, numbness or tingling. No change in gait, balance, coordination, mood, affect, memory, mentation, behavior. · Psychiatric: No anxiety, no depression. · Endocrine: No malaise, fatigue or temperature intolerance. No excessive thirst, fluid intake, or urination. No tremor. · Hematologic/Lymphatic: No abnormal bruising or bleeding, blood clots or swollen lymph nodes. · Allergic/Immunologic: No nasal congestion or hives.     Physical Examination:    Vitals:    03/19/20 1221   BP: 132/82   Pulse: 80   SpO2: 98%        Constitutional and General Appearance: NAD   Respiratory:  · Normal excursion and expansion without use of accessory muscles  · Resp Auscultation: Normal breath sounds without dullness  Cardiovascular:  · The apical impulses not displaced  · Heart tones are crisp and normal  · Cervical veins are not engorged  · The carotid upstroke is normal in amplitude and contour without delay or bruit  · Normal

## 2020-03-20 ENCOUNTER — TELEPHONE (OUTPATIENT)
Dept: CARDIOLOGY CLINIC | Age: 50
End: 2020-03-20

## 2020-03-20 NOTE — TELEPHONE ENCOUNTER
The patient had mentioned that they were having a cough. I think that they discussed stopping the ACE due to that. His note states to discuss changing the ACE inhibitor with his primary care physician.

## 2020-05-13 ENCOUNTER — HOSPITAL ENCOUNTER (OUTPATIENT)
Dept: CARDIOLOGY | Age: 50
Discharge: HOME | End: 2020-05-13
Payer: COMMERCIAL

## 2020-05-13 ENCOUNTER — TELEPHONE (OUTPATIENT)
Dept: CARDIOLOGY CLINIC | Age: 50
End: 2020-05-13

## 2020-05-13 LAB
LV EF: 55 %
LVEF MODALITY: NORMAL

## 2020-05-13 PROCEDURE — A9502 TC99M TETROFOSMIN: HCPCS | Performed by: INTERNAL MEDICINE

## 2020-05-13 PROCEDURE — 6360000002 HC RX W HCPCS: Performed by: INTERNAL MEDICINE

## 2020-05-13 PROCEDURE — 3430000000 HC RX DIAGNOSTIC RADIOPHARMACEUTICAL: Performed by: INTERNAL MEDICINE

## 2020-05-13 PROCEDURE — 78452 HT MUSCLE IMAGE SPECT MULT: CPT

## 2020-05-13 PROCEDURE — 93306 TTE W/DOPPLER COMPLETE: CPT

## 2020-05-13 PROCEDURE — 93017 CV STRESS TEST TRACING ONLY: CPT

## 2020-05-13 RX ADMIN — REGADENOSON 0.4 MG: 0.08 INJECTION, SOLUTION INTRAVENOUS at 08:10

## 2020-05-13 RX ADMIN — TETROFOSMIN 28.5 MILLICURIE: 1.38 INJECTION, POWDER, LYOPHILIZED, FOR SOLUTION INTRAVENOUS at 08:10

## 2020-05-14 ENCOUNTER — TELEPHONE (OUTPATIENT)
Dept: MAMMOGRAPHY | Age: 50
End: 2020-05-14

## 2020-05-20 ENCOUNTER — TELEPHONE (OUTPATIENT)
Dept: CARDIOLOGY CLINIC | Age: 50
End: 2020-05-20

## 2020-05-20 NOTE — TELEPHONE ENCOUNTER
----- Message from Natasha Correa MD sent at 5/20/2020 12:43 PM EDT -----  I spoke w/ pt. She was exposed to corona virus and unable to come in for rest images. However, she had negative covid-19 test today and is able to schedule return for rest images. Please notify [facility] for proper scheduling and procedure.

## 2020-05-21 ENCOUNTER — HOSPITAL ENCOUNTER (OUTPATIENT)
Dept: CARDIOLOGY | Age: 50
Discharge: HOME | End: 2020-05-21
Payer: COMMERCIAL

## 2020-05-21 LAB
LV EF: 60 %
LVEF MODALITY: NORMAL

## 2020-05-21 PROCEDURE — 78452 HT MUSCLE IMAGE SPECT MULT: CPT

## 2020-05-21 PROCEDURE — 3430000000 HC RX DIAGNOSTIC RADIOPHARMACEUTICAL: Performed by: INTERNAL MEDICINE

## 2020-05-21 PROCEDURE — 93017 CV STRESS TEST TRACING ONLY: CPT

## 2020-05-21 PROCEDURE — 78451 HT MUSCLE IMAGE SPECT SING: CPT

## 2020-05-21 PROCEDURE — A9502 TC99M TETROFOSMIN: HCPCS | Performed by: INTERNAL MEDICINE

## 2020-05-21 RX ADMIN — TETROFOSMIN 31 MILLICURIE: 1.38 INJECTION, POWDER, LYOPHILIZED, FOR SOLUTION INTRAVENOUS at 08:20

## 2020-05-22 ENCOUNTER — TELEPHONE (OUTPATIENT)
Dept: CARDIOLOGY CLINIC | Age: 50
End: 2020-05-22

## 2020-05-22 ENCOUNTER — OFFICE VISIT (OUTPATIENT)
Dept: PRIMARY CARE CLINIC | Age: 50
End: 2020-05-22

## 2020-05-22 NOTE — TELEPHONE ENCOUNTER
----- Message from Sanna Amor MD sent at 5/22/2020  9:43 AM EDT -----  Stress test is abnormal and suggest may have sig blockage  Schedule for RHC and LHC. Radial and brachial approach.   Pt reports recent negative covid test but will need repeated prior to cardiac cath

## 2020-05-22 NOTE — TELEPHONE ENCOUNTER
Spoke with patient. Patient is scheduled with Dr. Leonard Mccann for Right & Left Heart Cath on 5/29/20 at [facility], arrival time of 7:30am to the Cath Lab. Please have patient arrive to the main entrance of [facility] and check in with the registration desk. Please call patient regarding medication instructions. Remind patient to be NPO after midnight (8 hours prior). Do not apply lotions/creams on skin the day of procedure. COVID-19 scheduled 5/22.

## 2020-05-22 NOTE — TELEPHONE ENCOUNTER
Left message for patient to call back regarding medication prior to procedure. Hold Metformin the morning of the procedure. Take ASA, Lisinopril, and Atenolol the morning of the procedure with a small sip of water.

## 2020-05-25 LAB
SARS-COV-2: NOT DETECTED
SOURCE: NORMAL

## 2020-05-29 ENCOUNTER — HOSPITAL ENCOUNTER (OUTPATIENT)
Dept: CARDIAC CATH/INVASIVE PROCEDURES | Age: 50
Discharge: HOME | End: 2020-05-29
Attending: INTERNAL MEDICINE | Admitting: INTERNAL MEDICINE
Payer: COMMERCIAL

## 2020-05-29 VITALS — BODY MASS INDEX: 43.82 KG/M2 | HEIGHT: 65 IN | WEIGHT: 263 LBS

## 2020-05-29 LAB
ANION GAP SERPL CALCULATED.3IONS-SCNC: 10 MMOL/L (ref 3–16)
BUN BLDV-MCNC: 14 MG/DL (ref 7–20)
CALCIUM SERPL-MCNC: 9.2 MG/DL (ref 8.3–10.6)
CHLORIDE BLD-SCNC: 102 MMOL/L (ref 99–110)
CHOLESTEROL, TOTAL: 129 MG/DL (ref 0–199)
CO2: 24 MMOL/L (ref 21–32)
CREAT SERPL-MCNC: 0.8 MG/DL (ref 0.6–1.1)
EKG ATRIAL RATE: 63 BPM
EKG DIAGNOSIS: NORMAL
EKG P AXIS: 27 DEGREES
EKG P-R INTERVAL: 160 MS
EKG Q-T INTERVAL: 382 MS
EKG QRS DURATION: 88 MS
EKG QTC CALCULATION (BAZETT): 390 MS
EKG R AXIS: 50 DEGREES
EKG T AXIS: 38 DEGREES
EKG VENTRICULAR RATE: 63 BPM
GFR AFRICAN AMERICAN: >60
GFR NON-AFRICAN AMERICAN: >60
GLUCOSE BLD-MCNC: 153 MG/DL (ref 70–99)
HCT VFR BLD CALC: 41.9 % (ref 36–48)
HDLC SERPL-MCNC: 32 MG/DL (ref 40–60)
HEMOGLOBIN: 13.5 G/DL (ref 12–16)
INR BLD: 1.13 (ref 0.86–1.14)
LDL CHOLESTEROL CALCULATED: 76 MG/DL
MCH RBC QN AUTO: 27.3 PG (ref 26–34)
MCHC RBC AUTO-ENTMCNC: 32.2 G/DL (ref 31–36)
MCV RBC AUTO: 84.7 FL (ref 80–100)
PDW BLD-RTO: 16.6 % (ref 12.4–15.4)
PLATELET # BLD: 412 K/UL (ref 135–450)
PMV BLD AUTO: 7.5 FL (ref 5–10.5)
POTASSIUM REFLEX MAGNESIUM: 3.9 MMOL/L (ref 3.5–5.1)
PROTHROMBIN TIME: 13.1 SEC (ref 10–13.2)
RBC # BLD: 4.95 M/UL (ref 4–5.2)
SODIUM BLD-SCNC: 136 MMOL/L (ref 136–145)
TRIGL SERPL-MCNC: 107 MG/DL (ref 0–150)
VLDLC SERPL CALC-MCNC: 21 MG/DL
WBC # BLD: 13.2 K/UL (ref 4–11)

## 2020-05-29 PROCEDURE — 93010 ELECTROCARDIOGRAM REPORT: CPT | Performed by: INTERNAL MEDICINE

## 2020-05-29 PROCEDURE — C1894 INTRO/SHEATH, NON-LASER: HCPCS

## 2020-05-29 PROCEDURE — 85027 COMPLETE CBC AUTOMATED: CPT

## 2020-05-29 PROCEDURE — C1769 GUIDE WIRE: HCPCS

## 2020-05-29 PROCEDURE — 99152 MOD SED SAME PHYS/QHP 5/>YRS: CPT

## 2020-05-29 PROCEDURE — 6360000002 HC RX W HCPCS

## 2020-05-29 PROCEDURE — 85610 PROTHROMBIN TIME: CPT

## 2020-05-29 PROCEDURE — 93460 R&L HRT ART/VENTRICLE ANGIO: CPT

## 2020-05-29 PROCEDURE — 2500000003 HC RX 250 WO HCPCS

## 2020-05-29 PROCEDURE — 93460 R&L HRT ART/VENTRICLE ANGIO: CPT | Performed by: INTERNAL MEDICINE

## 2020-05-29 PROCEDURE — 93005 ELECTROCARDIOGRAM TRACING: CPT | Performed by: INTERNAL MEDICINE

## 2020-05-29 PROCEDURE — 99153 MOD SED SAME PHYS/QHP EA: CPT

## 2020-05-29 PROCEDURE — 2709999900 HC NON-CHARGEABLE SUPPLY

## 2020-05-29 PROCEDURE — 2580000003 HC RX 258

## 2020-05-29 PROCEDURE — C1760 CLOSURE DEV, VASC: HCPCS

## 2020-05-29 PROCEDURE — 80061 LIPID PANEL: CPT

## 2020-05-29 PROCEDURE — C1887 CATHETER, GUIDING: HCPCS

## 2020-05-29 PROCEDURE — 6360000004 HC RX CONTRAST MEDICATION

## 2020-05-29 PROCEDURE — 6370000000 HC RX 637 (ALT 250 FOR IP)

## 2020-05-29 PROCEDURE — 80048 BASIC METABOLIC PNL TOTAL CA: CPT

## 2020-05-29 RX ORDER — ROSUVASTATIN CALCIUM 5 MG/1
5 TABLET, COATED ORAL DAILY
Qty: 90 TABLET | Refills: 1 | Status: SHIPPED | OUTPATIENT
Start: 2020-05-29 | End: 2020-07-16 | Stop reason: SDUPTHER

## 2020-05-29 RX ORDER — SODIUM CHLORIDE 0.9 % (FLUSH) 0.9 %
10 SYRINGE (ML) INJECTION EVERY 12 HOURS SCHEDULED
Status: DISCONTINUED | OUTPATIENT
Start: 2020-05-29 | End: 2020-05-29 | Stop reason: HOSPADM

## 2020-05-29 RX ORDER — SODIUM CHLORIDE 0.9 % (FLUSH) 0.9 %
10 SYRINGE (ML) INJECTION PRN
Status: DISCONTINUED | OUTPATIENT
Start: 2020-05-29 | End: 2020-05-29 | Stop reason: HOSPADM

## 2020-05-29 RX ORDER — SODIUM CHLORIDE 9 MG/ML
INJECTION, SOLUTION INTRAVENOUS CONTINUOUS
Status: DISCONTINUED | OUTPATIENT
Start: 2020-05-29 | End: 2020-05-29 | Stop reason: HOSPADM

## 2020-05-29 RX ORDER — MULTIVIT-MIN/IRON/FOLIC ACID/K 18-600-40
CAPSULE ORAL
COMMUNITY

## 2020-05-29 RX ORDER — MIDAZOLAM HYDROCHLORIDE 5 MG/ML
INJECTION INTRAMUSCULAR; INTRAVENOUS
Status: COMPLETED | OUTPATIENT
Start: 2020-05-29 | End: 2020-05-29

## 2020-05-29 RX ORDER — ACETAMINOPHEN 325 MG/1
650 TABLET ORAL EVERY 4 HOURS PRN
Status: DISCONTINUED | OUTPATIENT
Start: 2020-05-29 | End: 2020-05-29 | Stop reason: HOSPADM

## 2020-05-29 RX ORDER — ASPIRIN 325 MG
325 TABLET ORAL ONCE
Status: COMPLETED | OUTPATIENT
Start: 2020-05-29 | End: 2020-05-29

## 2020-05-29 RX ORDER — ONDANSETRON 2 MG/ML
4 INJECTION INTRAMUSCULAR; INTRAVENOUS EVERY 6 HOURS PRN
Status: DISCONTINUED | OUTPATIENT
Start: 2020-05-29 | End: 2020-05-29 | Stop reason: HOSPADM

## 2020-05-29 RX ORDER — FENTANYL CITRATE 50 UG/ML
INJECTION, SOLUTION INTRAMUSCULAR; INTRAVENOUS
Status: COMPLETED | OUTPATIENT
Start: 2020-05-29 | End: 2020-05-29

## 2020-05-29 RX ADMIN — MIDAZOLAM HYDROCHLORIDE 2 MG: 5 INJECTION INTRAMUSCULAR; INTRAVENOUS at 11:07

## 2020-05-29 RX ADMIN — FENTANYL CITRATE 25 MCG: 50 INJECTION, SOLUTION INTRAMUSCULAR; INTRAVENOUS at 11:42

## 2020-05-29 RX ADMIN — Medication 325 MG: at 09:09

## 2020-05-29 RX ADMIN — FENTANYL CITRATE 25 MCG: 50 INJECTION, SOLUTION INTRAMUSCULAR; INTRAVENOUS at 11:07

## 2020-05-29 NOTE — BRIEF OP NOTE
Procedures: LHC, RHC, LVG, CA, sedation    LM <20%  LAD 40%  Cx <20%  RCA 20%  LVEF 55%  PA 42/15/27, PCWP 13    ASA  Attempt low dose crestor (intolerant lipitor in past)  Stop atenolol (hypotension and bradycardia throughout her stay)

## 2020-05-29 NOTE — LETTER
[address] [address]  Phone: [phone]  Fax: [phone]             May 29, 2020    Patient: Greyson Bonds   YOB: 1970   Date of Visit: 5/29/2020       To Whom It May Concern:    Sg Michaud was seen and treated in our facility  beginning 5/29/2020 until 5/29/2020. She may return to work on Wednesday, June 3rd with no restrictions.       Sincerely,       Mabel Boast, RN         Signature:__________________________________

## 2020-06-10 ENCOUNTER — HOSPITAL ENCOUNTER (OUTPATIENT)
Age: 50
Discharge: HOME | End: 2020-06-10
Payer: COMMERCIAL

## 2020-06-10 LAB
BASOPHILS ABSOLUTE: 0 K/UL (ref 0–0.2)
BASOPHILS RELATIVE PERCENT: 0.2 %
EOSINOPHILS ABSOLUTE: 0.1 K/UL (ref 0–0.6)
EOSINOPHILS RELATIVE PERCENT: 0.7 %
HCT VFR BLD CALC: 40.2 % (ref 36–48)
HEMOGLOBIN: 12.7 G/DL (ref 12–16)
LYMPHOCYTES ABSOLUTE: 2 K/UL (ref 1–5.1)
LYMPHOCYTES RELATIVE PERCENT: 14.7 %
MCH RBC QN AUTO: 26.9 PG (ref 26–34)
MCHC RBC AUTO-ENTMCNC: 31.7 G/DL (ref 31–36)
MCV RBC AUTO: 84.9 FL (ref 80–100)
MONO TEST: NEGATIVE
MONOCYTES ABSOLUTE: 0.9 K/UL (ref 0–1.3)
MONOCYTES RELATIVE PERCENT: 6.3 %
NEUTROPHILS ABSOLUTE: 10.7 K/UL (ref 1.7–7.7)
NEUTROPHILS RELATIVE PERCENT: 78.1 %
PDW BLD-RTO: 16.7 % (ref 12.4–15.4)
PLATELET # BLD: 411 K/UL (ref 135–450)
PMV BLD AUTO: 7.6 FL (ref 5–10.5)
RBC # BLD: 4.74 M/UL (ref 4–5.2)
SEDIMENTATION RATE, ERYTHROCYTE: 101 MM/HR (ref 0–30)
WBC # BLD: 13.7 K/UL (ref 4–11)

## 2020-06-10 PROCEDURE — 83036 HEMOGLOBIN GLYCOSYLATED A1C: CPT

## 2020-06-10 PROCEDURE — 86140 C-REACTIVE PROTEIN: CPT

## 2020-06-10 PROCEDURE — 83550 IRON BINDING TEST: CPT

## 2020-06-10 PROCEDURE — 83735 ASSAY OF MAGNESIUM: CPT

## 2020-06-10 PROCEDURE — 84480 ASSAY TRIIODOTHYRONINE (T3): CPT

## 2020-06-10 PROCEDURE — 84439 ASSAY OF FREE THYROXINE: CPT

## 2020-06-10 PROCEDURE — 84550 ASSAY OF BLOOD/URIC ACID: CPT

## 2020-06-10 PROCEDURE — 80048 BASIC METABOLIC PNL TOTAL CA: CPT

## 2020-06-10 PROCEDURE — 86376 MICROSOMAL ANTIBODY EACH: CPT

## 2020-06-10 PROCEDURE — 86308 HETEROPHILE ANTIBODY SCREEN: CPT

## 2020-06-10 PROCEDURE — 84481 FREE ASSAY (FT-3): CPT

## 2020-06-10 PROCEDURE — 82306 VITAMIN D 25 HYDROXY: CPT

## 2020-06-10 PROCEDURE — 84443 ASSAY THYROID STIM HORMONE: CPT

## 2020-06-10 PROCEDURE — 85025 COMPLETE CBC W/AUTO DIFF WBC: CPT

## 2020-06-10 PROCEDURE — 83540 ASSAY OF IRON: CPT

## 2020-06-10 PROCEDURE — 82746 ASSAY OF FOLIC ACID SERUM: CPT

## 2020-06-10 PROCEDURE — 80076 HEPATIC FUNCTION PANEL: CPT

## 2020-06-10 PROCEDURE — 82728 ASSAY OF FERRITIN: CPT

## 2020-06-10 PROCEDURE — 82607 VITAMIN B-12: CPT

## 2020-06-10 PROCEDURE — 85652 RBC SED RATE AUTOMATED: CPT

## 2020-06-10 PROCEDURE — 36415 COLL VENOUS BLD VENIPUNCTURE: CPT

## 2020-06-11 LAB
ALBUMIN SERPL-MCNC: 3.5 G/DL (ref 3.4–5)
ALP BLD-CCNC: 69 U/L (ref 40–129)
ALT SERPL-CCNC: 17 U/L (ref 10–40)
ANION GAP SERPL CALCULATED.3IONS-SCNC: 10 MMOL/L (ref 3–16)
AST SERPL-CCNC: 20 U/L (ref 15–37)
BILIRUB SERPL-MCNC: 0.4 MG/DL (ref 0–1)
BILIRUBIN DIRECT: <0.2 MG/DL (ref 0–0.3)
BILIRUBIN, INDIRECT: NORMAL MG/DL (ref 0–1)
BUN BLDV-MCNC: 16 MG/DL (ref 7–20)
C-REACTIVE PROTEIN: 72.2 MG/L (ref 0–5.1)
CALCIUM SERPL-MCNC: 9.4 MG/DL (ref 8.3–10.6)
CHLORIDE BLD-SCNC: 98 MMOL/L (ref 99–110)
CO2: 26 MMOL/L (ref 21–32)
CREAT SERPL-MCNC: 0.8 MG/DL (ref 0.6–1.1)
ESTIMATED AVERAGE GLUCOSE: 139.9 MG/DL
FOLATE: 3.12 NG/ML (ref 4.78–24.2)
GFR AFRICAN AMERICAN: >60
GFR NON-AFRICAN AMERICAN: >60
GLUCOSE BLD-MCNC: 112 MG/DL (ref 70–99)
HBA1C MFR BLD: 6.5 %
IRON SATURATION: 10 % (ref 15–50)
IRON: 22 UG/DL (ref 37–145)
MAGNESIUM: 1.8 MG/DL (ref 1.8–2.4)
POTASSIUM SERPL-SCNC: 4.7 MMOL/L (ref 3.5–5.1)
SODIUM BLD-SCNC: 134 MMOL/L (ref 136–145)
T3 TOTAL: 1.34 NG/ML (ref 0.8–2)
T4 FREE: 1.3 NG/DL (ref 0.9–1.8)
TOTAL IRON BINDING CAPACITY: 220 UG/DL (ref 260–445)
TOTAL PROTEIN: 8.2 G/DL (ref 6.4–8.2)
TSH REFLEX: 0.11 UIU/ML (ref 0.27–4.2)
URIC ACID, SERUM: 6.1 MG/DL (ref 2.6–6)
VITAMIN B-12: 367 PG/ML (ref 211–911)
VITAMIN D 25-HYDROXY: 42.3 NG/ML

## 2020-06-11 NOTE — PROGRESS NOTES
feeling well she has not been eating or drinking like she should. She stated that blood pressure has been running 92/60 -108/80 HR 80-90's at home. Stated being treated for UTI - she stated she has not been drinking as much but has increased her intake. She denies any complaints to right groin access. 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 Denies myalgias after starting crestor. She stated sugars have been well controlled. Savi Mays describes symptoms including dizziness and fatigue but denies chest pain, dyspnea, palpitations, orthopnea, PND, exertional chest pressure/discomfort, early saiety, edema, syncope. Review of Systems   Constitutional: Negative for activity change and fatigue. Respiratory: Negative for cough, chest tightness and shortness of breath. Cardiovascular: Negative for chest pain, palpitations and leg swelling. Musculoskeletal: Negative for myalgias. Neurological: Positive for dizziness. Negative for weakness. Psychiatric/Behavioral: Negative for sleep disturbance. Prior to Visit Medications    Medication Sig Taking?  Authorizing Provider   Cholecalciferol (VITAMIN D) 50 MCG (2000 UT) CAPS capsule Take by mouth  Historical Provider, MD   rosuvastatin (CRESTOR) 5 MG tablet Take 1 tablet by mouth daily  Tereza Guardado MD   magnesium (MAGNESIUM-OXIDE) 250 MG TABS tablet Take 250 mg by mouth daily  Historical Provider, MD   ferrous sulfate (IRON 325) 325 (65 Fe) MG tablet Take 325 mg by mouth daily (with breakfast)  Historical Provider, MD   ESOMEPRAZOLE MAGNESIUM PO Take by mouth  Historical Provider, MD   DICLOFENAC PO Take by mouth  Historical Provider, MD   melatonin 3 MG TABS tablet Take 3 mg by mouth daily  Historical Provider, MD   aspirin 81 MG tablet Take 81 mg by mouth daily  Historical Provider, MD   [address] TEST strip   Historical Provider, MD   blood glucose test strips (ASCENSIA pt is to take orthostatics and report back to office- she doesn't want to come into office due to her work schedule  5. DM  6. KORY  7. Tobacco abuse: not interested in quitting  8. Obesity: The patient is asked to make an attempt to improve diet and exercise patterns to aid in medical management of this problem. PLAN:  1. Pt unable to come in for orthostatic b/p checks- due to work schedule Check orthostatic blood pressures at home- she will call with results, reviewed how to complete them x3 days  2. Lipid and hepatic function 2 months  3. Healthy lifestyle education reviewed including nutrition, controlling BP, lipids, exercise  activity,and weight loss advised   4. Smoking cessation  5. Follow up Dr. Jessica Chris in 6 months, or sooner pending orthostatic  6. Stay hydrated  7. Follow up with primary care in regard to lab work that was completed on 6/10- she is going to wait on PCP to call. note TSH low, and iron sat low ? Cause of dizziness? Padma Cano is a 44 y.o. female being evaluated by a Virtual Visit (video visit) encounter to address concerns as mentioned above. A caregiver was present when appropriate. Due to this being a TeleHealth encounter (During [facility]-20 public health emergency), evaluation of the following organ systems was limited: Vitals/Constitutional/EENT/Resp/CV/GI//MS/Neuro/Skin/Heme-Lymph-Imm. Pursuant to the emergency declaration under the [facility] [address] and the [employer] Act, this Virtual Visit was conducted with patient's (and/or legal guardian's) consent, to reduce the patient's risk of exposure to COVID-19 and provide necessary medical care. The patient (and/or legal guardian) has also been advised to contact this office for worsening conditions or problems, and seek emergency medical treatment and/or call 911 if deemed necessary.      Services

## 2020-06-12 LAB
FERRITIN: 268.7 NG/ML (ref 15–150)
T3 FREE: 3.9 PG/ML (ref 2.3–4.2)

## 2020-06-13 LAB — THYROID PEROXIDASE (TPO) ABS: 34 IU/ML

## 2020-06-15 ENCOUNTER — VIRTUAL VISIT (OUTPATIENT)
Dept: CARDIOLOGY CLINIC | Age: 50
End: 2020-06-15
Payer: COMMERCIAL

## 2020-06-15 PROCEDURE — 99442 PR PHYS/QHP TELEPHONE EVALUATION 11-20 MIN: CPT | Performed by: NURSE PRACTITIONER

## 2020-06-15 ASSESSMENT — ENCOUNTER SYMPTOMS
CHEST TIGHTNESS: 0
COUGH: 0
SHORTNESS OF BREATH: 0

## 2020-06-15 NOTE — LETTER
Vitals/Constitutional/EENT/Resp/CV/GI//MS/Neuro/Skin/Heme-Lymph-Imm. Pursuant to the emergency declaration under the [address] [place] [address], [address] [place] [address] and the [employer] and [employer] Act, this Virtual Visit was conducted with patient's (and/or legal guardian's) consent, to reduce the patient's risk of exposure to COVID-19 and provide necessary medical care. The patient (and/or legal guardian) has also been advised to contact this office for worsening conditions or problems, and seek emergency medical treatment and/or call 911 if deemed necessary. Services were provided through a telephone discussion virtually to substitute for in-person clinic visit. Patient and provider were located at their individual homes. --ROBBI Hernnádez CNP on 6/11/2020   An electronic signature was used to authenticate this note.        Sincerely,      ROBBI Hernández CNP

## 2020-07-15 ENCOUNTER — TELEPHONE (OUTPATIENT)
Dept: CARDIOLOGY CLINIC | Age: 50
End: 2020-07-15

## 2020-07-15 NOTE — TELEPHONE ENCOUNTER
Pt called and requested her rx be sent to a mail order pharmacy.  rosuvastatin (CRESTOR) 5 MG tablet     On once daily    [facility] [phone]

## 2020-07-16 RX ORDER — ROSUVASTATIN CALCIUM 5 MG/1
5 TABLET, COATED ORAL DAILY
Qty: 90 TABLET | Refills: 3 | Status: SHIPPED | OUTPATIENT
Start: 2020-07-16 | End: 2021-07-07

## 2020-07-29 ENCOUNTER — HOSPITAL ENCOUNTER (OUTPATIENT)
Age: 50
Discharge: HOME | End: 2020-07-29
Payer: COMMERCIAL

## 2020-07-29 LAB
ANION GAP SERPL CALCULATED.3IONS-SCNC: 13 MMOL/L (ref 3–16)
BUN BLDV-MCNC: 14 MG/DL (ref 7–20)
C-REACTIVE PROTEIN: 8.9 MG/L (ref 0–5.1)
CALCIUM SERPL-MCNC: 9.4 MG/DL (ref 8.3–10.6)
CHLORIDE BLD-SCNC: 105 MMOL/L (ref 99–110)
CO2: 25 MMOL/L (ref 21–32)
CREAT SERPL-MCNC: 0.6 MG/DL (ref 0.6–1.1)
FERRITIN: 57.8 NG/ML (ref 15–150)
GFR AFRICAN AMERICAN: >60
GFR NON-AFRICAN AMERICAN: >60
GLUCOSE BLD-MCNC: 102 MG/DL (ref 70–99)
IRON SATURATION: 17 % (ref 15–50)
IRON: 51 UG/DL (ref 37–145)
POTASSIUM SERPL-SCNC: 5.5 MMOL/L (ref 3.5–5.1)
SEDIMENTATION RATE, ERYTHROCYTE: 54 MM/HR (ref 0–30)
SODIUM BLD-SCNC: 143 MMOL/L (ref 136–145)
T3 FREE: 7.8 PG/ML (ref 2.3–4.2)
THYROID PEROXIDASE (TPO) ABS: 34 IU/ML
TOTAL IRON BINDING CAPACITY: 302 UG/DL (ref 260–445)
TSH SERPL DL<=0.05 MIU/L-ACNC: 0.22 UIU/ML (ref 0.27–4.2)

## 2020-07-29 PROCEDURE — 83550 IRON BINDING TEST: CPT

## 2020-07-29 PROCEDURE — 85652 RBC SED RATE AUTOMATED: CPT

## 2020-07-29 PROCEDURE — 36415 COLL VENOUS BLD VENIPUNCTURE: CPT

## 2020-07-29 PROCEDURE — 86140 C-REACTIVE PROTEIN: CPT

## 2020-07-29 PROCEDURE — 86376 MICROSOMAL ANTIBODY EACH: CPT

## 2020-07-29 PROCEDURE — 84443 ASSAY THYROID STIM HORMONE: CPT

## 2020-07-29 PROCEDURE — 80048 BASIC METABOLIC PNL TOTAL CA: CPT

## 2020-07-29 PROCEDURE — 83540 ASSAY OF IRON: CPT

## 2020-07-29 PROCEDURE — 82728 ASSAY OF FERRITIN: CPT

## 2020-07-29 PROCEDURE — 84481 FREE ASSAY (FT-3): CPT

## 2020-08-28 ENCOUNTER — HOSPITAL ENCOUNTER (OUTPATIENT)
Dept: ULTRASOUND IMAGING | Age: 50
Discharge: HOME | End: 2020-08-28
Payer: COMMERCIAL

## 2020-08-28 VITALS
HEART RATE: 64 BPM | DIASTOLIC BLOOD PRESSURE: 96 MMHG | SYSTOLIC BLOOD PRESSURE: 167 MMHG | OXYGEN SATURATION: 97 % | RESPIRATION RATE: 19 BRPM

## 2020-08-28 PROCEDURE — 60100 BIOPSY OF THYROID: CPT

## 2020-08-28 PROCEDURE — 88342 IMHCHEM/IMCYTCHM 1ST ANTB: CPT

## 2020-08-28 PROCEDURE — 88173 CYTOPATH EVAL FNA REPORT: CPT

## 2020-08-28 PROCEDURE — 88305 TISSUE EXAM BY PATHOLOGIST: CPT

## 2020-08-28 NOTE — BRIEF OP NOTE
Brief Postoperative Note    Tripp Mays  YOB: 1970  [id number]    Pre-operative Diagnosis: right thyroid nodule    Post-operative Diagnosis: Same    Procedure: right thyroid nodule FNA    Anesthesia: Local    Surgeons: Roque Baires MD    Estimated Blood Loss: Less than 5 mL    Complications: None    Specimens: Was Obtained: 3 FNA specimens    Findings: Successful right thyroid nodule FNA.     Electronically signed by Roque Baires MD on 8/28/2020 at 11:04 AM

## 2020-08-28 NOTE — PROGRESS NOTES
Pt cleared for discharge. Pt alert and oriented x 4. Denies any pain or SOB. Surgical site dressing clean, dry, and intact. Vital signs stable see flow sheets. Discharge instructions provided and explained. All questions answered. Pt verbalizes understanding of the discharge instructions and state no more questions. Pt discharged in stable condition with all belongings.

## 2020-08-28 NOTE — PROGRESS NOTES
Pt arrived for image guided thyroid nodule biopsy. Procedure explained including the risk and benefits of the procedure. All questions answered. Pt verbalizes understanding of the procedure and states no more questions. Consent signed. Vital signs stable see flow sheets. Labs, allergies, medications, and code status reviewed. No Contraindications noted.

## 2020-08-28 NOTE — PROGRESS NOTES
Image guided thyroid nodule/FNA biopsy completed. 3 passes completed on the left parotid gland. Pt tolerated procedure without any signs or symptoms of distress vital signs stable see flow sheets.

## 2020-10-07 ENCOUNTER — TELEPHONE (OUTPATIENT)
Dept: CARDIOLOGY CLINIC | Age: 50
End: 2020-10-07

## 2020-10-07 NOTE — LETTER
'              [address] [facility] [place] [address] [address] [address]  Phone: [phone]  Fax: [phone]    Monico Mauro MD        October 9, 2020     Dequan Huertassimon  [facility] [address]  1970    To whom it may concern,          Dequan Carranza has no cardiac contraindication to surgery. She may hold Aspirin for procedure. If you have any questions or concerns, please don't hesitate to call.     Sincerely,        Monico Mauro MD

## 2020-11-03 ENCOUNTER — HOSPITAL ENCOUNTER (OUTPATIENT)
Dept: WOMENS IMAGING | Age: 50
Discharge: HOME | End: 2020-11-03
Payer: COMMERCIAL

## 2020-11-03 PROCEDURE — 77067 SCR MAMMO BI INCL CAD: CPT

## 2020-12-03 ENCOUNTER — TELEPHONE (OUTPATIENT)
Dept: PULMONOLOGY | Age: 50
End: 2020-12-03

## 2020-12-03 NOTE — TELEPHONE ENCOUNTER
Within this Telehealth Consent, the terms you and yours refer to the person using the Telehealth Service (Service), or in the case of a use of the Service by or on behalf of a minor, you and yours refer to and include (i) the parent or legal guardian who provides consent to the use of the Service by such minor or uses the Service on behalf of such minor, and (ii) the minor for whom consent is being provided or on whose behalf the Service is being utilized. When using Service, you will be consulting with your health care providers via the use of Telehealth.   Telehealth involves the delivery of healthcare services using electronic communications, information technology or other means between a healthcare provider and a patient who are not in the same physical location. Telehealth may be used for diagnosis, treatment, follow-up and/or patient education, and may include, but is not limited to, one or more of the following:    Electronic transmission of medical records, photo images, personal health information or other data between a patient and a healthcare provider    Interactions between a patient and healthcare provider via audio, video and/or data communications    Use of output data from medical devices, sound and video files    Anticipated Benefits   The use of Telehealth by your Provider(s) through the Service may have the following possible benefits:    Making it easier and more efficient for you to access medical care and treatment for the conditions treated by such Provider(s) utilizing the Service    Allowing you to obtain medical care and treatment by Provider(s) at times that are convenient for you    Enabling you to interact with Provider(s) without the necessity of an in-office appointment     Possible Risks   While the use of Telehealth can provide potential benefits for you, there are also potential risks associated with the use of Telehealth.  These risks include, but may not be limited to the following:    Your Provider(s) may not able to provide medical treatment for your particular condition and you may be required to seek alternative healthcare or emergency care services.  The electronic systems or other security protocols or safeguards used in the Service could fail, causing a breach of privacy of your medical or other information.  Given regulatory requirements in certain jurisdictions, your Provider(s) diagnosis and/or treatment options, especially pertaining to certain prescriptions, may be limited. Acceptance   1. You understand that Services will be provided via Telehealth. This process involves the use of HIPAA compliant and secure, real-time audio-visual interfacing with a qualified and appropriately trained provider located at [facility]. 2. You understand that, under no circumstances, will this session be recorded. 3. You understand that the Provider(s) at [facility] and other clinical participants will be party to the information obtained during the Telehealth session in accordance with best medical practices. 4. You understand that the information obtained during the Telehealth session will be used to help determine the most appropriate treatment options. 5. You understand that You have the right to revoke this consent at any point in time. 6. You understand that Telehealth is voluntary, and that continued treatment is not dependent upon consent. 7. You understand that, in the event of non-consent to Telehealth services and/or technical difficulties, you will obtain services as typically provided in the absence of Telehealth technology. 8. You understand that this consent will be kept in Your medical record. 9. No potential benefits from the use of Telehealth or specific results can be guaranteed. Your condition may not be cured or improved and, in some cases, may get worse.    10. There are limitations in the provision of medical care and treatment via Telehealth and the Service and you may not be able to receive diagnosis and/or treatment through the Service for every condition for which you seek diagnosis and/or treatment. 11. There are potential risks to the use of Telehealth, including but not limited to the risks described in this Telehealth Consent. 12. Your Provider(s) have discussed the use of Telehealth and the Service with you, including the benefits and risks of such and you have provided oral consent to your Provider(s) for the use of Telehealth and the Service. 15. You understand that it is your duty to provide your Provider(s) truthful, accurate and complete information, including all relevant information regarding care that you may have received or may be receiving from other healthcare providers outside of the Service. 14. You understand that each of your Provider(s) may determine in his or sole discretion that your condition is not suitable for diagnosis and/or treatment using the Service, and that you may need to seek medical care and treatment a specialist or other healthcare provider, outside of the Service. 15. You understand that you are fully responsible for payment for all services provided by Provider(s) or through use of the Service and that you may not be able to use third-party insurance. 16. You represent that (a) you have read this Telehealth Consent carefully, (b) you understand the risks and benefits of the Service and the use of Telehealth in the medical care and treatment provided to you by Provider(s) using the Service, and (c) you have the legal capacity and authority to provide this consent for yourself and/or the minor for which you are consenting under applicable federal and state laws, including laws relating to the age of [de-identified] and/or parental/guardian consent.    17. You give your informed consent to the use of Telehealth by Provider(s) using the Service under

## 2020-12-07 ENCOUNTER — VIRTUAL VISIT (OUTPATIENT)
Dept: PULMONOLOGY | Age: 50
End: 2020-12-07
Payer: COMMERCIAL

## 2020-12-07 PROCEDURE — 99442 PR PHYS/QHP TELEPHONE EVALUATION 11-20 MIN: CPT | Performed by: NURSE PRACTITIONER

## 2020-12-07 RX ORDER — LEVOTHYROXINE SODIUM 0.07 MG/1
75 TABLET ORAL DAILY
COMMUNITY

## 2020-12-07 ASSESSMENT — SLEEP AND FATIGUE QUESTIONNAIRES
HOW LIKELY ARE YOU TO NOD OFF OR FALL ASLEEP WHILE SITTING QUIETLY AFTER LUNCH WITHOUT ALCOHOL: 0
HOW LIKELY ARE YOU TO NOD OFF OR FALL ASLEEP WHEN YOU ARE A PASSENGER IN A CAR FOR AN HOUR WITHOUT A BREAK: 0
HOW LIKELY ARE YOU TO NOD OFF OR FALL ASLEEP WHILE LYING DOWN TO REST IN THE AFTERNOON WHEN CIRCUMSTANCES PERMIT: 0
HOW LIKELY ARE YOU TO NOD OFF OR FALL ASLEEP WHILE SITTING AND READING: 1
HOW LIKELY ARE YOU TO NOD OFF OR FALL ASLEEP WHILE SITTING AND TALKING TO SOMEONE: 0
ESS TOTAL SCORE: 2
HOW LIKELY ARE YOU TO NOD OFF OR FALL ASLEEP WHILE WATCHING TV: 1
HOW LIKELY ARE YOU TO NOD OFF OR FALL ASLEEP WHILE SITTING INACTIVE IN A PUBLIC PLACE: 0
HOW LIKELY ARE YOU TO NOD OFF OR FALL ASLEEP IN A CAR, WHILE STOPPED FOR A FEW MINUTES IN TRAFFIC: 0

## 2020-12-07 NOTE — PATIENT INSTRUCTIONS

## 2020-12-07 NOTE — PROGRESS NOTES
Patient ID: Mayco Byrd is a 44 y.o. female who is being seen today for   Chief Complaint   Patient presents with    Sleep Apnea     1 year sleep          HPI:   Mayco Byrd is a 44 y.o. female for telephone only virtual visit for KORY follow up. States she is doing well with BIPAP. Patient is using BiPAP  6 hrs/night. Using humidifier. No snoring on BiPAP. The pressure is well tolerated. The mask is comfortable-nasal pillows. No mask leak. No significant daytime sleepiness. No nodding off when driving. No dry nose or throat. No fatigue. Bedtime is MN 1 am and rise time is 7-8 am. Sleep onset is  45 minutes- states can't shut off brain, just lays there, has TV on. Wakes up 1 times at night total. 1 nocturia. It takes few minutes to fall back a sleep. Rare naps during the day, 10 minutes. No headache in am. No weight gain. 1-2 caffienated beverages during the day. No alcohol. ESS is 2.       Sleep Medicine 12/7/2020 12/9/2019 12/4/2018 12/4/2018 12/1/2017 8/17/2017   Sitting and reading 1 2 1 1 1 3   Watching TV 1 0 2 2 2 3   Sitting, inactive in a public place (e.g. a theatre or a meeting) 0 0 1 1 3 3   As a passenger in a car for an hour without a break 0 0 0 0 0 1   Lying down to rest in the afternoon when circumstances permit 0 3 3 3 3 3   Sitting and talking to someone 0 0 1 1 0 1   Sitting quietly after a lunch without alcohol 0 0 3 3 1 3   In a car, while stopped for a few minutes in traffic 0 0 0 0 0 1   Total score 2 5 11 11 10 18   Neck circumference - 18.5 - 18.5 19 19       Past Medical History:  Past Medical History:   Diagnosis Date    Acid reflux     Diabetes mellitus ([address]     Hyperlipidemia     Hypertension     KORY treated with BiPAP        Past Surgical History:        Procedure Laterality Date    ARM SURGERY      L arm    CARDIAC CATHETERIZATION  05/2020    CHOLECYSTECTOMY      HERNIA REPAIR      HYSTERECTOMY      OTHER SURGICAL HISTORY Left     surgery on left elbow    MN SHLDR ARTHROSCOP,SURG,W/ROTAT CUFF REPR Left 10/3/2018    SHOULDER ARTHROSCOPY, ROTATOR CUFF REPAIR, SUBACROMIAL DECOMPRESSION performed by Charly Mccoy DO at [place] [address] ARTHROSCOPY Left 10/03/2018    ROTATOR CUFF REPAIR, ROTATION MEDICAL GRAFT, SUBACROMIAL DECOMPRESSION     THYROIDECTOMY, PARTIAL Right 10/23/2020    US THYROID BIOPSY  8/28/2020    US THYROID BIOPSY 8[place]28[place]2020 [facility]    VENTRAL HERNIA REPAIR N/A 9/4/14    REPAIR OF INCARCERATED VENTRAL HERNIA       Allergies:  is allergic to atorvastatin. Social History:    TOBACCO:   reports that she has been smoking cigarettes. She has a 33.00 pack-year smoking history. She has never used smokeless tobacco.  ETOH:   reports no history of alcohol use. Family History:   History reviewed. No pertinent family history.     Current Medications:    Current Outpatient Medications:     levothyroxine (SYNTHROID) 75 MCG tablet, Take 75 mcg by mouth Daily, Disp: , Rfl:     rosuvastatin (CRESTOR) 5 MG tablet, Take 1 tablet by mouth daily, Disp: 90 tablet, Rfl: 3    Cholecalciferol (VITAMIN D) 50 MCG (2000 UT) CAPS capsule, Take by mouth, Disp: , Rfl:     magnesium (MAGNESIUM-OXIDE) 250 MG TABS tablet, Take 250 mg by mouth daily, Disp: , Rfl:     ferrous sulfate (IRON 325) 325 (65 Fe) MG tablet, Take 325 mg by mouth daily (with breakfast), Disp: , Rfl:     DICLOFENAC PO, Take by mouth, Disp: , Rfl:     aspirin 81 MG tablet, Take 81 mg by mouth daily, Disp: , Rfl:     ONE TOUCH ULTRA TEST strip, , Disp: , Rfl:     blood glucose test strips (ASCENSIA AUTODISC VI;ONE TOUCH ULTRA TEST VI) strip, Check Blood glucose  Twice Daily, Disp: , Rfl:     budesonide-formoterol (SYMBICORT) 80-4.5 MCG/ACT AERO, USE 2 INHALATIONS ORALLY   TWICE DAILY, Disp: , Rfl:     Probiotic Product (PROBIOTIC DAILY PO), Take 1 capsule by mouth daily, Disp: , Rfl:     Coenzyme Q10 (CO Q-10) 100 MG CAPS, Take 100 mg by mouth daily, Disp: , Rfl:     albuterol sulfate  (90 Base) MCG/ACT inhaler, Inhale 1-2 puffs into the lungs, Disp: , Rfl:     metFORMIN (GLUCOPHAGE) 1000 MG tablet, Take 1,500 mg by mouth 2 times daily (with meals) 500 AM, 1000 PM, Disp: , Rfl:     montelukast (SINGULAIR) 10 MG tablet, Take 10 mg by mouth nightly., Disp: , Rfl:     levocetirizine (XYZAL) 5 MG tablet, Take 5 mg by mouth nightly., Disp: , Rfl:     Omega-3 Fatty Acids (FISH OIL) 1000 MG CAPS, Take 6,000 mg by mouth daily. , Disp: , Rfl:     Glucosamine-Chondroit-Vit C-Mn (GLUCOSAMINE CHONDR 500 COMPLEX PO), Take 1 tablet by mouth daily. , Disp: , Rfl:     Pyridoxine HCl (VITAMIN B-6) 100 MG tablet, Take 200 mg by mouth daily. , Disp: , Rfl:     Ascorbic Acid (VITAMIN C) 250 MG tablet, Take 1,000 mg by mouth daily , Disp: , Rfl:     ESOMEPRAZOLE MAGNESIUM PO, Take by mouth, Disp: , Rfl:       Objective:   PHYSICAL EXAM:    There were no vitals taken for this visit. DATA:   11/7/2006 PSG AHI 74.3, low SPO2 51%  11/10/2006 titration recommendations BiPAP 20/13 cm H2O  10/6/2017 titration recommendations BiPAP 14/10 cm H2O    BiPAP compliance data:  Compliance download report from 11/8/19 to 12/7/19 showed patient is using machine 7:09 hrs/night with 100% compliance and AHI 0.9 within this time frame. 30/30days with greater than 4 hours of machine use. BIPAP 14/10 cm H20    Compliance download report from 11/2/20 to 12/1/20 reviewed today by me and showed patient is using machine 6:58 hrs/night with 97% compliance and AHI 0.7 within this time frame. 29/30days with greater than 4 hours of machine use. BIPAP 14/10 cm H20     Assessment:      · Severe KORY. BiPAP 14/10 cm H2O.   Optimal compliance and efficacy on review today  · Hypersomnia-improved with BiPAP  · Sleep onset insomnia-psychophysiological hyperarousal, poor sleep hygiene likely contributing  · Obesity  · Snoring resolved on BiPAP  · Comorbid conditions: DM and HTN    Plan:       -Continue BiPAP 14/10 cm H2O.  -Supply order to DME of patient's choice. - Advised to use CPAP 6-8 hrs at night and during naps. - Replacement of mask, tubing, head straps every 3-6 months or sooner if damaged. - Patient instructed to contact DME company for any mask, tubing or machine trouble shooting if problems arise.  - Sleep hygiene  -Cognitive behavioral therapy was discussed with patient including stimulus control and sleep restriction, worry period before bed  - Avoid sedatives, alcohol and caffeinated drinks at bed time. - Patient counseled to never drive or operate heavy machinery while fatigue, drowsy or sleepy. - Weight loss is recommended as a long-term intervention.    - Complications of KORY if not treated were discussed with patient patient, including: systemic hypertension, pulmonary hypertension, cardiovascular morbidities, car accidents and all cause mortality.  -Patient education regarding sleep tips and CPAP cleaning recommendations     Follow up: 1 year, sooner if needed    Consent for telehealth visit was obtained and is noted in chart    Paul Bro is a 44 y.o. female evaluated via telephone on 12/7/2020. I affirm this is a Patient Initiated Episode with a Patient who has not had a related appointment within my department in the past 7 days or scheduled within the next 24 hours.     Patient identification was verified at the start of the visit: Yes    Total Time: minutes: 11-20 minutes    Note: not billable if this call serves to triage the patient into an appointment for the relevant concern      Ilya June

## 2020-12-15 NOTE — PROGRESS NOTES
2020    Follow-up, Coronary Artery Disease, and Hypertension       TELEHEALTH EVALUATION -- Audio (During ECIPU-20 public health emergency)    HPI: Caroline Pickens is a 44 y.o. female who is here today for follow up for a history of coronary artery disease- S? P right and left heart cath 2020, nonobstructive disease, hyperlipidemia, and dizziness. Atenolol was stopped due to hypotension and bradycardia. Today she states she has been feeling well. She is tolerating her medications, Crestor at a low dose. Blood pressure at home runs 123.70's. she has decreased to 10 cigarettes daily. Mild SOB unchanged. Patient currently denies any weight gain, edema, palpitations, chest pain, dizziness, and syncope. Caroline Pickens (:  1970) has requested an audio evaluation for the following concern(s): follow up for CAD. [x] Medications and dosages reviewed. ROS:  [x]Full ROS obtained and negative except as mentioned in HPI    Prior to Visit Medications    Medication Sig Taking?  Authorizing Provider   levothyroxine (SYNTHROID) 75 MCG tablet Take 75 mcg by mouth Daily Yes Historical Provider, MD   rosuvastatin (CRESTOR) 5 MG tablet Take 1 tablet by mouth daily Yes Ammy Dougherty MD   Cholecalciferol (VITAMIN D) 50 MCG (2000) CAPS capsule Take by mouth Yes Historical Provider, MD   magnesium (MAGNESIUM-OXIDE) 250 MG TABS tablet Take 250 mg by mouth daily Yes Historical Provider, MD   ferrous sulfate (IRON 325) 325 (65 Fe) MG tablet Take 325 mg by mouth daily (with breakfast) Yes Historical Provider, MD   DICLOFENAC PO Take by mouth Yes Historical Provider, MD   aspirin 81 MG tablet Take 81 mg by mouth daily Yes Historical Provider, MD   ONE TOUCH ULTRA TEST strip  Yes Historical Provider, MD   blood glucose test strips (ASCENSIA AUTODISC VI;ONE TOUCH ULTRA TEST VI) strip Check Blood glucose  Twice Daily Yes Historical Provider, MD   budesonide-formoterol (SYMBICORT) 80-4.5 MCG/ACT AERO USE 2 INHALATIONS ORALLY   TWICE DAILY Yes Historical Provider, MD   Probiotic Product (PROBIOTIC DAILY PO) Take 1 capsule by mouth daily Yes Historical Provider, MD   Coenzyme Q10 (CO Q-10) 100 MG CAPS Take 100 mg by mouth daily Yes Historical Provider, MD   albuterol sulfate  (90 Base) MCG/ACT inhaler Inhale 1-2 puffs into the lungs Yes Historical Provider, MD   metFORMIN (GLUCOPHAGE) 1000 MG tablet Take 1,500 mg by mouth 2 times daily (with meals) 500 AM, 500 PM Yes Historical Provider, MD   montelukast (SINGULAIR) 10 MG tablet Take 10 mg by mouth nightly. Yes Historical Provider, MD   levocetirizine (XYZAL) 5 MG tablet Take 5 mg by mouth nightly. Yes Historical Provider, MD   Omega-3 Fatty Acids (FISH OIL) 1000 MG CAPS Take 6,000 mg by mouth daily. Yes Historical Provider, MD   Glucosamine-Chondroit-Vit C-Mn (GLUCOSAMINE CHONDR 500 COMPLEX PO) Take 1 tablet by mouth daily. Yes Historical Provider, MD   Pyridoxine HCl (VITAMIN B-6) 100 MG tablet Take 200 mg by mouth daily.  Yes Historical Provider, MD   Ascorbic Acid (VITAMIN C) 250 MG tablet Take 1,000 mg by mouth daily  Yes Historical Provider, MD   ESOMEPRAZOLE MAGNESIUM PO Take by mouth  Historical Provider, MD       Social History     Tobacco Use    Smoking status: Current Every Day Smoker     Packs/day: 1.00     Years: 33.00     Pack years: 33.00     Types: Cigarettes     Last attempt to quit: 2014     Years since quittin.2    Smokeless tobacco: Never Used   Substance Use Topics    Alcohol use: Yes     Comment: at times    Drug use: No          Allergies   Allergen Reactions    Atorvastatin      Other reaction(s): Muscle Aches   ,   Past Medical History:   Diagnosis Date    Acid reflux     Diabetes mellitus ([facility] [address]     Hyperlipidemia     Hypertension     KORY treated with BiPAP    ,   Past Surgical History:   Procedure Laterality Date    ARM SURGERY      L arm    CARDIAC CATHETERIZATION  2020    CHOLECYSTECTOMY      HERNIA REPAIR  HYSTERECTOMY      OTHER SURGICAL HISTORY Left     surgery on left elbow    NH SHLDR ARTHROSCOP,SURG,W/ROTAT CUFF REPR Left 10/3/2018    SHOULDER ARTHROSCOPY, ROTATOR CUFF REPAIR, SUBACROMIAL DECOMPRESSION performed by Lorelei Gee DO at [place] [id number] ARTHROSCOPY Left 10/03/2018    ROTATOR CUFF REPAIR, ROTATION MEDICAL GRAFT, SUBACROMIAL DECOMPRESSION     THYROIDECTOMY, PARTIAL Right 10/23/2020    US THYROID BIOPSY  2020    US THYROID BIOPSY 2020 [facility]    VENTRAL HERNIA REPAIR N/A 14    REPAIR OF INCARCERATED VENTRAL HERNIA   ,   Social History     Tobacco Use    Smoking status: Current Every Day Smoker     Packs/day: 1.00     Years: 33.00     Pack years: 33.00     Types: Cigarettes     Last attempt to quit: 2014     Years since quittin.2    Smokeless tobacco: Never Used   Substance Use Topics    Alcohol use: Yes     Comment: at times    Drug use: No   , History reviewed. No pertinent family history. ,   Immunization History   Administered Date(s) Administered    Influenza Vaccine, unspecified formulation 10/20/2014, 2016, 2017    Influenza Virus Vaccine 2016, 2017    Influenza, Harlen Kilts, IM, (6 mo and older Fluzone, Flulaval, Fluarix and 3 yrs and older Afluria) 10/20/2014, 2016, 2017    Influenza, Harlen Kilts, Recombinant, IM PF (Flublok 18 yrs and older) 10/06/2020    Pneumococcal Polysaccharide ([id number]) 2014    Tdap (Boostrix, Adacel) 01/15/1986, 10/20/2014   ,   Health Maintenance   Topic Date Due    Diabetic foot exam  1978    Diabetic retinal exam  1978    HIV screen  1983    Cervical cancer screen  1989    Diabetic microalbuminuria test  2017    Shingles Vaccine (1 of 2) 2018    Colon cancer screen colonoscopy  2018    Lipid screen  2021    A1C test (Diabetic or Prediabetic)  06/10/2021    Breast cancer screen  2022    DTaP/Tdap/Td vaccine (3 - Td) 10/20/2024    Flu vaccine  Completed    Pneumococcal 0-64 years Vaccine  Completed    Hepatitis A vaccine  Aged Out    Hepatitis B vaccine  Aged Out    Hib vaccine  Aged Out    Meningococcal (ACWY) vaccine  Aged Out       PHYSICAL EXAMINATION:  [ INSTRUCTIONS:  \"[x]\" Indicates a positive item  \"[]\" Indicates a negative item  -- DELETE ALL ITEMS NOT EXAMINED]  Vital Signs: (As obtained by patient/caregiver or practitioner observation)    Blood pressure- 129/85 Heart rate- 84   Respiratory rate-    Temperature-  Pulse oximetry-       Other pertinent observable physical exam findings-     Stress test 5/26/10  Impression- 1. Breast attenuation is favored at the anterior wall. Hernando Nanny is a zone of reversibility of the lateral wall toward the base which may represent variable breast attenuation^ ischemia at this site cannot be excluded. 2. Normal left ventricular ejection fraction.        Echocardiogram 6/4/15  Summary   Normal left ventricle size and systolic function with an estimated ejection   fraction of 55%. No regional wall motion abnormalities are seen.  Hernando Nanny is mild concentric left ventricular hypertrophy.   Diastolic filling parameters suggest grade II diastolic dysfunction.   The right ventricle is mildly enlarged.   The right atrium is mildly dilated.   There is mild tricuspid regurgitation.   Systolic pulmonary artery pressure (SPAP) is normal and estimated at 37 mmHg   (RA pressure 8 mmHg).     CT chest 3/4/2020  Enlargement of the main pulmonary artery suggesting pulmonary hypertension. Coronary artery disease      Echocardiogram 5/13/2020  Summary   Technically difficult examination. Normal left ventricle systolic function with an estimated ejection fraction   of 55%. Mild concentric left ventricular hypertrophy. Normal left ventricular diastolic filling pressure. Mild pulmonic regurgitation. Trace mitral and tricuspid regurgitation.    Systolic pulmonary artery pressure (SPAP) is normal and estimated at 29 mmHg   (right atrial pressure 3 mmHg). Stress test 5/21/2020   Conclusions    Summary  Normal LV function. Mixed anteroapical ischemia/scar  Technically difficult study with heterogeneous tracer uptake       Left heart cath 5/29/2020  Procedures: LHC, RHC, LVG, CA, sedation     LM <20%  LAD 40%  Cx <20%  RCA 20%  LVEF 55%  PA 42/15/27, PCWP 13    ASSESSMENT:  Coronary artery disease - nonobstructive, asymptomatic, stable  Possible pulmonary hypertension based on CT. Would likely be due to smoking, obesity, KORY as apposed to primary  SALGUERO - suspect deconditioning, obesity, smoking. Hypertension - controlled  Hyperlipidemia  - only tolerates low dose statin  Diabetes mellitus   KORY   Smoking - decreasing   Obesity     Plan:   Fasting lipids and CMP  Smoking cessation encouraged   Continue current medications   Check blood pressure at home weekly  Regular exercise and following a healthy diet encouraged   Follow up with me in 1 year         Pam Hill is a 44 y.o. female being evaluated by a telephone visit encounter to address concerns as mentioned above. A caregiver was present when appropriate. Due to this being a TeleHealth encounter (During EXKTP-76 public health emergency), evaluation of the following organ systems was limited: Vitals/Constitutional/EENT/Resp/CV/GI//MS/Neuro/Skin/Heme-Lymph-Imm. Pursuant to the emergency declaration under the [address] [place] [address], [address] [place] [address] authority and the [employer] Act, this Virtual Visit was conducted with patient's (and/or legal guardian's) consent, to reduce the patient's risk of exposure to COVID-19 and provide necessary medical care. The patient (and/or legal guardian) has also been advised to contact this office for worsening conditions or problems, and seek emergency medical treatment and/or call 911 if deemed necessary.     John's attestation: This note was scribed in the presence of Dr. Mary Teague M.D. By Faiza Holley RN    Patient is having a telephone visit today for follow up lasting approximately 11-20 minutes. Services were provided through a telephone synchronous discussion virtually to substitute for in-person clinic visit. I am seeing the patient today from my office and the patient from their home. --Faiza Holley RN on 12/16/2020 at 2:40 PM     The scribes documentation has been prepared under my direction and personally reviewed by me in its entirety. I confirm that the note above accurately reflects all work, treatment, procedures, and medical decision making performed by me. Dr. Mary Teague MD        An electronic signature was used to authenticate this note.

## 2020-12-16 ENCOUNTER — VIRTUAL VISIT (OUTPATIENT)
Dept: CARDIOLOGY CLINIC | Age: 50
End: 2020-12-16
Payer: COMMERCIAL

## 2020-12-16 PROBLEM — E78.2 MIXED HYPERLIPIDEMIA: Status: ACTIVE | Noted: 2020-12-16

## 2020-12-16 PROCEDURE — 99442 PR PHYS/QHP TELEPHONE EVALUATION 11-20 MIN: CPT | Performed by: INTERNAL MEDICINE

## 2020-12-26 ENCOUNTER — HOSPITAL ENCOUNTER (OUTPATIENT)
Age: 50
Discharge: HOME | End: 2020-12-26
Payer: COMMERCIAL

## 2020-12-26 LAB
A/G RATIO: 1.1 (ref 1.1–2.2)
ALBUMIN SERPL-MCNC: 4.1 G/DL (ref 3.4–5)
ALP BLD-CCNC: 98 U/L (ref 40–129)
ALT SERPL-CCNC: 29 U/L (ref 10–40)
ANION GAP SERPL CALCULATED.3IONS-SCNC: 10 MMOL/L (ref 3–16)
AST SERPL-CCNC: 34 U/L (ref 15–37)
BILIRUB SERPL-MCNC: 0.5 MG/DL (ref 0–1)
BUN BLDV-MCNC: 14 MG/DL (ref 7–20)
CALCIUM SERPL-MCNC: 9.7 MG/DL (ref 8.3–10.6)
CHLORIDE BLD-SCNC: 102 MMOL/L (ref 99–110)
CHOLESTEROL, TOTAL: 125 MG/DL (ref 0–199)
CO2: 25 MMOL/L (ref 21–32)
CREAT SERPL-MCNC: 0.7 MG/DL (ref 0.6–1.1)
GFR AFRICAN AMERICAN: >60
GFR NON-AFRICAN AMERICAN: >60
GLOBULIN: 3.9 G/DL
GLUCOSE BLD-MCNC: 115 MG/DL (ref 70–99)
HDLC SERPL-MCNC: 44 MG/DL (ref 40–60)
LDL CHOLESTEROL CALCULATED: 45 MG/DL
POTASSIUM SERPL-SCNC: 4.8 MMOL/L (ref 3.5–5.1)
SODIUM BLD-SCNC: 137 MMOL/L (ref 136–145)
TOTAL PROTEIN: 8 G/DL (ref 6.4–8.2)
TRIGL SERPL-MCNC: 180 MG/DL (ref 0–150)
VLDLC SERPL CALC-MCNC: 36 MG/DL

## 2020-12-26 PROCEDURE — 80053 COMPREHEN METABOLIC PANEL: CPT

## 2020-12-26 PROCEDURE — 80061 LIPID PANEL: CPT

## 2020-12-26 PROCEDURE — 36415 COLL VENOUS BLD VENIPUNCTURE: CPT

## 2020-12-28 ENCOUNTER — TELEPHONE (OUTPATIENT)
Dept: CARDIOLOGY CLINIC | Age: 50
End: 2020-12-28

## 2020-12-28 NOTE — TELEPHONE ENCOUNTER
Created telephone encounter. Spoke with Rahel Beaulieu relayed message per [facility] regarding labs. Pt verbalized understanding.

## 2020-12-28 NOTE — TELEPHONE ENCOUNTER
----- Message from Rosalie Ireland MD sent at 12/28/2020  7:55 AM EST -----  I reviewed the labs and see no major change or issues compared to prior studies. Continue current medical regimen. Call patient with results and recommendations.

## 2021-07-07 RX ORDER — ROSUVASTATIN CALCIUM 5 MG/1
TABLET, COATED ORAL
Qty: 90 TABLET | Refills: 3 | Status: SHIPPED | OUTPATIENT
Start: 2021-07-07 | End: 2022-01-18 | Stop reason: SDUPTHER

## 2021-12-06 ENCOUNTER — OFFICE VISIT (OUTPATIENT)
Dept: CARDIOLOGY CLINIC | Age: 51
End: 2021-12-06
Payer: COMMERCIAL

## 2021-12-06 VITALS
HEIGHT: 65 IN | SYSTOLIC BLOOD PRESSURE: 152 MMHG | DIASTOLIC BLOOD PRESSURE: 80 MMHG | HEART RATE: 83 BPM | WEIGHT: 293 LBS | BODY MASS INDEX: 48.82 KG/M2 | OXYGEN SATURATION: 97 %

## 2021-12-06 DIAGNOSIS — I25.10 CORONARY ARTERY DISEASE INVOLVING NATIVE CORONARY ARTERY OF NATIVE HEART WITHOUT ANGINA PECTORIS: Primary | ICD-10-CM

## 2021-12-06 DIAGNOSIS — E78.2 MIXED HYPERLIPIDEMIA: ICD-10-CM

## 2021-12-06 DIAGNOSIS — R06.02 SOB (SHORTNESS OF BREATH): ICD-10-CM

## 2021-12-06 PROCEDURE — 99214 OFFICE O/P EST MOD 30 MIN: CPT | Performed by: INTERNAL MEDICINE

## 2021-12-06 NOTE — PATIENT INSTRUCTIONS
Plan:  Smoking cessation encouraged  Repeat fasting lipids next year   Cardiac medications reviewed including indications and pertinent side effects    Check blood pressure and heart rate at home a few times per week- keep a log with dates and times and bring to office visit   Regular exercise and following a healthy diet encouraged   Follow up with me in 1 year

## 2021-12-06 NOTE — PROGRESS NOTES
[address]   Cardiac Followup    Referring Provider:  [facility] - CNP     Chief Complaint   Patient presents with    1 Year Follow Up      Juno Villarreal   1970      History of Present Illness:    Juno Villarreal is a 46 y.o. female who is here today for follow up for a past medical history of coronary artery disease- S/P right and left heart cath 5/29/2020, nonobstructive disease, hyperlipidemia, and dizziness. Atenolol was stopped due to hypotension and bradycardia. an echocardiogram from 5/13/2020 showed an EF of 55%. Today she states she has been feeling well since her last visit. She is tolerating her medications and taking them as prescribed. Her blood pressure at home runs 130/80. She continues to smoke daily. Patient currently denies any weight gain, edema, palpitations, chest pain, shortness of breath, dizziness, and syncope. Chronic SALGUERO unchanged. Past Medical History:   has a past medical history of Acid reflux, Diabetes mellitus ([address] Hyperlipidemia, Hypertension, and KORY treated with BiPAP. Surgical History:   has a past surgical history that includes Hysterectomy; hernia repair; Cholecystectomy; other surgical history (Left); ventral hernia repair (N/A, 9/4/14); Arm Surgery; Shoulder arthroscopy (Left, 10/03/2018); pr shldr arthroscop,surg,w/rotat cuff repr (Left, 10/3/2018); US BIOPSY THYROID (8/28/2020); Thyroidectomy, partial (Right, 10/23/2020); and Cardiac catheterization (05/2020). Social History:   reports that she has been smoking cigarettes. She has a 33.00 pack-year smoking history. She has never used smokeless tobacco. She reports current alcohol use. She reports that she does not use drugs. Family History:  family history is not on file. Home Medications:  Prior to Admission medications    Medication Sig Start Date End Date Taking?  Authorizing Provider   rosuvastatin (CRESTOR) 5 MG tablet TAKE 1 TABLET DAILY 7/7/21  Yes Seymour Zhong MD Ady   levothyroxine (SYNTHROID) 75 MCG tablet Take 75 mcg by mouth Daily   Yes Historical Provider, MD   Cholecalciferol (VITAMIN D) 50 MCG (2000 UT) CAPS capsule Take by mouth   Yes Historical Provider, MD   magnesium (MAGNESIUM-OXIDE) 250 MG TABS tablet Take 250 mg by mouth daily   Yes Historical Provider, MD   ferrous sulfate (IRON 325) 325 (65 Fe) MG tablet Take 325 mg by mouth daily (with breakfast)   Yes Historical Provider, MD   DICLOFENAC PO Take by mouth   Yes Historical Provider, MD   aspirin 81 MG tablet Take 81 mg by mouth daily   Yes Historical Provider, MD   ONE TOUCH ULTRA TEST strip  4/4/18  Yes Historical Provider, MD   blood glucose test strips (ASCENSIA AUTODISC VI;ONE TOUCH ULTRA TEST VI) strip Check Blood glucose  Twice Daily 4/3/18  Yes Historical Provider, MD   budesonide-formoterol (SYMBICORT) 80-4.5 MCG/ACT AERO USE 2 INHALATIONS ORALLY   TWICE DAILY 2/27/18  Yes Historical Provider, MD   Probiotic Product (PROBIOTIC DAILY PO) Take 1 capsule by mouth daily   Yes Historical Provider, MD   Coenzyme Q10 (CO Q-10) 100 MG CAPS Take 100 mg by mouth daily   Yes Historical Provider, MD   albuterol sulfate  (90 Base) MCG/ACT inhaler Inhale 1-2 puffs into the lungs 5/1/17  Yes Historical Provider, MD   metFORMIN (GLUCOPHAGE) 1000 MG tablet Take 500 mg by mouth 2 times daily (with meals) 500 AM, 500 PM   Yes Historical Provider, MD   montelukast (SINGULAIR) 10 MG tablet Take 10 mg by mouth nightly. Yes Historical Provider, MD   levocetirizine (XYZAL) 5 MG tablet Take 5 mg by mouth nightly. Yes Historical Provider, MD   Omega-3 Fatty Acids (FISH OIL) 1000 MG CAPS Take 6,000 mg by mouth daily. Yes Historical Provider, MD   Glucosamine-Chondroit-Vit C-Mn (GLUCOSAMINE CHONDR 500 COMPLEX PO) Take 1 tablet by mouth daily. Yes Historical Provider, MD   Pyridoxine HCl (VITAMIN B-6) 100 MG tablet Take 200 mg by mouth daily.    Yes Historical Provider, MD   Ascorbic Acid (VITAMIN C) 250 MG tablet Take 1,000 mg by mouth daily    Yes Historical Provider, MD   ESOMEPRAZOLE MAGNESIUM PO Take by mouth    Historical Provider, MD        Allergies:  Atorvastatin     Review of Systems:   · Constitutional: there has been no unanticipated weight loss. There's been no change in energy level, sleep pattern, or activity level. · Eyes: No visual changes or diplopia. No scleral icterus. · ENT: No Headaches, hearing loss or vertigo. No mouth sores or sore throat. · Cardiovascular: Reviewed in HPI  · Respiratory: No cough or wheezing, no sputum production. No hematemesis. · Gastrointestinal: No abdominal pain, appetite loss, blood in stools. No change in bowel or bladder habits. · Genitourinary: No dysuria, trouble voiding, or hematuria. · Musculoskeletal:  No gait disturbance, weakness or joint complaints. · Integumentary: No rash or pruritis. · Neurological: No headache, diplopia, change in muscle strength, numbness or tingling. No change in gait, balance, coordination, mood, affect, memory, mentation, behavior. · Psychiatric: No anxiety, no depression. · Endocrine: No malaise, fatigue or temperature intolerance. No excessive thirst, fluid intake, or urination. No tremor. · Hematologic/Lymphatic: No abnormal bruising or bleeding, blood clots or swollen lymph nodes. · Allergic/Immunologic: No nasal congestion or hives.     Physical Examination:    BP (!) 152/80   Pulse 83   Ht 5' 5\" (1.651 m)   Wt 296 lb (134.3 kg)   SpO2 97%   BMI 49.26 kg/m²   Vitals:    12/06/21 1325   BP: (!) 152/80   Pulse:    SpO2:         Constitutional and General Appearance: NAD   Respiratory:  · Normal excursion and expansion without use of accessory muscles  · Resp Auscultation: Normal breath sounds without dullness  Cardiovascular:  · The apical impulses not displaced  · Heart tones are crisp and normal  · Cervical veins are not engorged  · The carotid upstroke is normal in amplitude and contour without delay or bruit  · Normal S1S2, No S3, No Murmur  · Peripheral pulses are symmetrical and full  · There is no clubbing, cyanosis of the extremities. · No edema  · Femoral Arteries: 2+ and equal  · Pedal Pulses: 2+ and equal   Abdomen:  · No masses or tenderness  · Liver/Spleen: No Abnormalities Noted  Neurological/Psychiatric:  · Alert and oriented in all spheres  · Moves all extremities well  · Exhibits normal gait balance and coordination  · No abnormalities of mood, affect, memory, mentation, or behavior are noted    Stress test 5/26/10  Impression- 1. Breast attenuation is favored at the anterior wall. Saira Dellen is a zone of reversibility of the lateral wall toward the base which may represent variable breast attenuation^ ischemia at this site cannot be excluded. 2. Normal left ventricular ejection fraction.        Echocardiogram 6/4/15  Summary   Normal left ventricle size and systolic function with an estimated ejection   fraction of 55%. No regional wall motion abnormalities are seen.  Saira Dellen is mild concentric left ventricular hypertrophy.   Diastolic filling parameters suggest grade II diastolic dysfunction.   The right ventricle is mildly enlarged.   The right atrium is mildly dilated.   There is mild tricuspid regurgitation.   Systolic pulmonary artery pressure (SPAP) is normal and estimated at 37 mmHg   (RA pressure 8 mmHg).     CT chest 3/4/2020  Enlargement of the main pulmonary artery suggesting pulmonary hypertension. Coronary artery disease        Echocardiogram 5/13/2020  Summary   Technically difficult examination.   Normal left ventricle systolic function with an estimated ejection fraction   of 55%.   Mild concentric left ventricular hypertrophy.   Normal left ventricular diastolic filling pressure.   Mild pulmonic regurgitation.   Trace mitral and tricuspid regurgitation.   Systolic pulmonary artery pressure (SPAP) is normal and estimated at 29 mmHg   (right atrial pressure 3 mmHg).      Stress test 5/21/2020   Conclusions    Summary  Normal LV function.  Mixed anteroapical ischemia/scar  Technically difficult study with heterogeneous tracer uptake         Left heart cath 5/29/2020  Procedures: LHC, RHC, LVG, CA, sedation     LM <20%  LAD 40%  Cx <20%  RCA 20%  LVEF 55%  PA 42/15/27, PCWP 13      Assessment:   Coronary artery disease - nonobstructive, asymptomatic, stable  Possible pulmonary hypertension based on CT nu normal RVSP on echo. SALGUERO - suspect deconditioning, obesity, smoking. Hypertension - elev today but controlled at home  Hyperlipidemia  - only tolerates low dose statin. Well controlled   Diabetes mellitus   KORY   Smoking   Obesity       Plan:  Smoking cessation encouraged  Repeat fasting lipids next year   Cardiac medications reviewed including indications and pertinent side effects. No changes. Refills as needed. Check blood pressure and heart rate at home a few times per week- keep a log with dates and times and bring to office visit   Regular exercise and following a healthy diet encouraged   Follow up with me in 1 year     Scribe's attestation: This note was scribed in the presence of Dr. Yosvany Buckner M.D. By Deep Rosario RN    The scribes documentation has been prepared under my direction and personally reviewed by me in its entirety. I confirm that the note above accurately reflects all work, treatment, procedures, and medical decision making performed by me. Dr. Yosvany Buckner MD    Thank you for allowing me to participate in the care of this individual.      Zee Hernandez.  Gabe Ram M.D., [place]

## 2021-12-13 ENCOUNTER — OFFICE VISIT (OUTPATIENT)
Dept: PULMONOLOGY | Age: 51
End: 2021-12-13
Payer: COMMERCIAL

## 2021-12-13 VITALS
SYSTOLIC BLOOD PRESSURE: 138 MMHG | HEART RATE: 84 BPM | RESPIRATION RATE: 18 BRPM | BODY MASS INDEX: 48.82 KG/M2 | HEIGHT: 65 IN | TEMPERATURE: 97.1 F | DIASTOLIC BLOOD PRESSURE: 93 MMHG | OXYGEN SATURATION: 98 % | WEIGHT: 293 LBS

## 2021-12-13 DIAGNOSIS — E66.01 OBESITY, MORBID, BMI 40.0-49.9 (HCC): ICD-10-CM

## 2021-12-13 DIAGNOSIS — Z71.89 CPAP USE COUNSELING: ICD-10-CM

## 2021-12-13 DIAGNOSIS — G47.33 SEVERE OBSTRUCTIVE SLEEP APNEA: Primary | ICD-10-CM

## 2021-12-13 PROCEDURE — 99213 OFFICE O/P EST LOW 20 MIN: CPT | Performed by: NURSE PRACTITIONER

## 2021-12-13 ASSESSMENT — SLEEP AND FATIGUE QUESTIONNAIRES
ESS TOTAL SCORE: 3
HOW LIKELY ARE YOU TO NOD OFF OR FALL ASLEEP IN A CAR, WHILE STOPPED FOR A FEW MINUTES IN TRAFFIC: 0
HOW LIKELY ARE YOU TO NOD OFF OR FALL ASLEEP WHEN YOU ARE A PASSENGER IN A CAR FOR AN HOUR WITHOUT A BREAK: 0
HOW LIKELY ARE YOU TO NOD OFF OR FALL ASLEEP WHILE SITTING AND TALKING TO SOMEONE: 0
HOW LIKELY ARE YOU TO NOD OFF OR FALL ASLEEP WHILE WATCHING TV: 0
HOW LIKELY ARE YOU TO NOD OFF OR FALL ASLEEP WHILE LYING DOWN TO REST IN THE AFTERNOON WHEN CIRCUMSTANCES PERMIT: 2
HOW LIKELY ARE YOU TO NOD OFF OR FALL ASLEEP WHILE SITTING INACTIVE IN A PUBLIC PLACE: 0
HOW LIKELY ARE YOU TO NOD OFF OR FALL ASLEEP WHILE SITTING QUIETLY AFTER LUNCH WITHOUT ALCOHOL: 0
HOW LIKELY ARE YOU TO NOD OFF OR FALL ASLEEP WHILE SITTING AND READING: 1
NECK CIRCUMFERENCE (INCHES): 18.5

## 2021-12-13 NOTE — PATIENT INSTRUCTIONS

## 2021-12-13 NOTE — PROGRESS NOTES
Patient ID: Vidya Araya is a 46 y.o. female who is being seen today for   Chief Complaint   Patient presents with    Sleep Apnea     1 yr         HPI:     Vidya Araya is a 46 y.o. female in office for OKRY follow up. Patient is using BiPAP 6  hrs/night. Using humidifier. No snoring on BiPAP. The pressure is well tolerated. The mask is comfortable- nasal pillows. +mask leak. No significant daytime sleepiness. No nodding off when driving. Sometimes dry mouth- did not tolerate chin strap, not too bothersome. Minimal fatigue. Bedtime is  pm and rise time is 6 am. Sleep onset is usually few-10 minutes. Wakes up 1-2 times at night total. 1-2 nocturia. It takes few minutes to fall back a sleep. Occasional naps during the day- 10-15 minute. No headache in am. No weight gain. 20 oz caffienated beverages during the day. Rare alcohol. ESS is 3.       Sleep Medicine 12/13/2021 12/7/2020 12/9/2019 12/4/2018 12/4/2018 12/1/2017 8/17/2017   Sitting and reading 1 1 2 1 1 1 3   Watching TV 0 1 0 2 2 2 3   Sitting, inactive in a public place (e.g. a theatre or a meeting) 0 0 0 1 1 3 3   As a passenger in a car for an hour without a break 0 0 0 0 0 0 1   Lying down to rest in the afternoon when circumstances permit 2 0 3 3 3 3 3   Sitting and talking to someone 0 0 0 1 1 0 1   Sitting quietly after a lunch without alcohol 0 0 0 3 3 1 3   In a car, while stopped for a few minutes in traffic 0 0 0 0 0 0 1   Total score 3 2 5 11 11 10 18   Neck circumference 18.5 - 18.5 - 18.5 19 19       Past Medical History:  Past Medical History:   Diagnosis Date    Acid reflux     Diabetes mellitus ([address]     Hyperlipidemia     Hypertension     KORY treated with BiPAP        Past Surgical History:        Procedure Laterality Date    ARM SURGERY      L arm    CARDIAC CATHETERIZATION  05/2020    CHOLECYSTECTOMY      HERNIA REPAIR      HYSTERECTOMY      OTHER SURGICAL HISTORY Left     surgery on left elbow    [place] SHLDR ARTHROSCOP,SURG,W/ROTAT CUFF REPR Left 10/3/2018    SHOULDER ARTHROSCOPY, ROTATOR CUFF REPAIR, SUBACROMIAL DECOMPRESSION performed by Minna Bee DO at [place] [address] ARTHROSCOPY Left 10/03/2018    ROTATOR CUFF REPAIR, ROTATION MEDICAL GRAFT, SUBACROMIAL DECOMPRESSION     THYROIDECTOMY, PARTIAL Right 10/23/2020    US THYROID BIOPSY  8/28/2020    US THYROID BIOPSY 8/28/2020 [facility] ULTRASOUND    VENTRAL HERNIA REPAIR N/A 9/4/14    REPAIR OF INCARCERATED VENTRAL HERNIA       Allergies:  is allergic to atorvastatin. Social History:    TOBACCO:   reports that she has been smoking cigarettes. She has a 34.00 pack-year smoking history. She has never used smokeless tobacco.  ETOH:   reports current alcohol use. Family History:   History reviewed. No pertinent family history.     Current Medications:    Current Outpatient Medications:     rosuvastatin (CRESTOR) 5 MG tablet, TAKE 1 TABLET DAILY, Disp: 90 tablet, Rfl: 3    levothyroxine (SYNTHROID) 75 MCG tablet, Take 75 mcg by mouth Daily, Disp: , Rfl:     Cholecalciferol (VITAMIN D) 50 MCG (2000 UT) CAPS capsule, Take by mouth, Disp: , Rfl:     magnesium (MAGNESIUM-OXIDE) 250 MG TABS tablet, Take 250 mg by mouth daily, Disp: , Rfl:     ferrous sulfate (IRON 325) 325 (65 Fe) MG tablet, Take 325 mg by mouth daily (with breakfast), Disp: , Rfl:     DICLOFENAC PO, Take by mouth, Disp: , Rfl:     aspirin 81 MG tablet, Take 81 mg by mouth daily, Disp: , Rfl:     ONE TOUCH ULTRA TEST strip, , Disp: , Rfl:     blood glucose test strips (ASCENSIA AUTODISC VI;ONE TOUCH ULTRA TEST VI) strip, Check Blood glucose  Twice Daily, Disp: , Rfl:     budesonide-formoterol (SYMBICORT) 80-4.5 MCG/ACT AERO, USE 2 INHALATIONS ORALLY   TWICE DAILY, Disp: , Rfl:     Probiotic Product (PROBIOTIC DAILY PO), Take 1 capsule by mouth daily, Disp: , Rfl:     Coenzyme Q10 (CO Q-10) 100 MG CAPS, Take 100 mg by mouth daily, Disp: , Rfl:     albuterol sulfate  (90 Base) MCG/ACT inhaler, Inhale 1-2 puffs into the lungs, Disp: , Rfl:     metFORMIN (GLUCOPHAGE) 1000 MG tablet, Take 500 mg by mouth 2 times daily (with meals) 500 AM, 500 PM, Disp: , Rfl:     montelukast (SINGULAIR) 10 MG tablet, Take 10 mg by mouth nightly., Disp: , Rfl:     levocetirizine (XYZAL) 5 MG tablet, Take 5 mg by mouth nightly., Disp: , Rfl:     Omega-3 Fatty Acids (FISH OIL) 1000 MG CAPS, Take 6,000 mg by mouth daily. , Disp: , Rfl:     Glucosamine-Chondroit-Vit C-Mn (GLUCOSAMINE CHONDR 500 COMPLEX PO), Take 1 tablet by mouth daily. , Disp: , Rfl:     Pyridoxine HCl (VITAMIN B-6) 100 MG tablet, Take 200 mg by mouth daily. , Disp: , Rfl:     Ascorbic Acid (VITAMIN C) 250 MG tablet, Take 1,000 mg by mouth daily , Disp: , Rfl:     ESOMEPRAZOLE MAGNESIUM PO, Take by mouth, Disp: , Rfl:       Objective:   PHYSICAL EXAM:    BP (!) 138/93   Pulse 84   Temp 97.1 °F (36.2 °C) (Temporal)   Resp 18   Ht 5' 5\" (1.651 m)   Wt 295 lb (133.8 kg)   SpO2 98% Comment: RA  BMI 49.09 kg/m²   Gen: No acute distress. Obese. BMI of 49.09  Eyes: PERRL. No sclera icterus. No conjunctival injection. ENT: No discharge. Neck: Trachea midline. No obvious mass. Neck circumference 18.5\"  Resp: No accessory muscle use. No crackles. No wheezes. No rhonchi. CV: Regular rate. Regular rhythm. No murmur or rub. Skin: Warm and dry. M/S: No cyanosis. No obvious joint deformity. Neuro: Awake. Alert. Moves all four extremities. Psych: Oriented x 3. No anxiety. DATA:   11/7/2006 PSG AHI 74.3, low SPO2 51%  11/10/2006 titration recommendations BiPAP 20/13 cm H2O  10/6/2017 titration recommendations BiPAP 14/10 cm H2O    BiPAP compliance data:  Compliance download report from 11/8/19 to 12/7/19 showed patient is using machine 7:09 hrs/night with 100% compliance and AHI 0.9 within this time frame. 30/30days with greater than 4 hours of machine use.   BIPAP 14/10 cm H20    Compliance download report from 11/2/20 to 12/1/20 reviewed today by me and showed patient is using machine 6:58 hrs/night with 97% compliance and AHI 0.7 within this time frame. 29/30days with greater than 4 hours of machine use. BIPAP 14/10 cm H20     Compliance download report from 11/8/21 to 12/7/21 reviewed today by me and showed patient is using machine 7:27 hrs/night with 100% compliance and AHI 0.8 within this time frame. 30/30days with greater than 4 hours of machine use. BIPAP 14/10 cm H20    Assessment:      · Severe KORY. BiPAP 14/10 cm H2O. Optimal compliance and efficacy on review today  · Hypersomniaimproved with BiPAP  · Sleep onset insomniapsychophysiological hyperarousal, poor sleep hygiene likely contributing  · Obesity  · Snoring resolved on BiPAP  · Comorbid conditions: DM and HTN    Plan:       -Continue BiPAP 14/10 cm H2O.  -Supply order to DME of patient's choice. - Advised to use CPAP 6-8 hrs at night and during naps. - Replacement of mask, tubing, head straps every 3-6 months or sooner if damaged. - Patient instructed to contact DME company for any mask, tubing or machine trouble shooting if problems arise.  - Sleep hygiene  -Cognitive behavioral therapy was discussed with patient including stimulus control and sleep restriction, worry period before bed  - Avoid sedatives, alcohol and caffeinated drinks at bed time. - Patient counseled to never drive or operate heavy machinery while fatigue, drowsy or sleepy.    - Weight loss is recommended as a long-term intervention.    - Complications of KORY if not treated were discussed with patient patient, including: systemic hypertension, pulmonary hypertension, cardiovascular morbidities, car accidents and all cause mortality.  -Patient education regarding sleep tips and CPAP cleaning recommendations     Follow up: 1 year, sooner if needed

## 2022-01-16 ENCOUNTER — PATIENT MESSAGE (OUTPATIENT)
Dept: CARDIOLOGY CLINIC | Age: 52
End: 2022-01-16

## 2022-01-17 NOTE — TELEPHONE ENCOUNTER
From: Edmund Mays  To: Dr. Rustam Dobbins: 2022 1:54 PM EST  Subject: refill    Can you please send a refill for a day supply of my Rosuvastatin to my mail order pharmacy which is now [facility]. Thank you.     Sesar Corral

## 2022-01-18 RX ORDER — ROSUVASTATIN CALCIUM 5 MG/1
TABLET, COATED ORAL
Qty: 90 TABLET | Refills: 3 | Status: SHIPPED | OUTPATIENT
Start: 2022-01-18

## 2023-01-05 ENCOUNTER — OFFICE VISIT (OUTPATIENT)
Dept: SLEEP MEDICINE | Age: 53
End: 2023-01-05
Payer: COMMERCIAL

## 2023-01-05 VITALS
OXYGEN SATURATION: 95 % | WEIGHT: 269 LBS | SYSTOLIC BLOOD PRESSURE: 124 MMHG | BODY MASS INDEX: 44.82 KG/M2 | HEART RATE: 76 BPM | DIASTOLIC BLOOD PRESSURE: 80 MMHG | TEMPERATURE: 96.8 F | RESPIRATION RATE: 17 BRPM | HEIGHT: 65 IN

## 2023-01-05 DIAGNOSIS — Z71.89 ENCOUNTER FOR BIPAP USE COUNSELING: ICD-10-CM

## 2023-01-05 DIAGNOSIS — R53.83 OTHER FATIGUE: ICD-10-CM

## 2023-01-05 DIAGNOSIS — G47.33 SEVERE OBSTRUCTIVE SLEEP APNEA: Primary | ICD-10-CM

## 2023-01-05 DIAGNOSIS — E66.01 OBESITY, MORBID, BMI 40.0-49.9 (HCC): ICD-10-CM

## 2023-01-05 PROCEDURE — 99213 OFFICE O/P EST LOW 20 MIN: CPT | Performed by: NURSE PRACTITIONER

## 2023-01-05 ASSESSMENT — SLEEP AND FATIGUE QUESTIONNAIRES
HOW LIKELY ARE YOU TO NOD OFF OR FALL ASLEEP WHILE LYING DOWN TO REST IN THE AFTERNOON WHEN CIRCUMSTANCES PERMIT: 3
HOW LIKELY ARE YOU TO NOD OFF OR FALL ASLEEP WHILE SITTING AND READING: 3
HOW LIKELY ARE YOU TO NOD OFF OR FALL ASLEEP WHILE SITTING QUIETLY AFTER LUNCH WITHOUT ALCOHOL: 0
HOW LIKELY ARE YOU TO NOD OFF OR FALL ASLEEP WHEN YOU ARE A PASSENGER IN A CAR FOR AN HOUR WITHOUT A BREAK: 0
HOW LIKELY ARE YOU TO NOD OFF OR FALL ASLEEP WHILE SITTING AND TALKING TO SOMEONE: 0
ESS TOTAL SCORE: 9
HOW LIKELY ARE YOU TO NOD OFF OR FALL ASLEEP IN A CAR, WHILE STOPPED FOR A FEW MINUTES IN TRAFFIC: 0
HOW LIKELY ARE YOU TO NOD OFF OR FALL ASLEEP WHILE WATCHING TV: 3
HOW LIKELY ARE YOU TO NOD OFF OR FALL ASLEEP WHILE SITTING INACTIVE IN A PUBLIC PLACE: 0
NECK CIRCUMFERENCE (INCHES): 16

## 2023-01-05 NOTE — PATIENT INSTRUCTIONS

## 2023-01-05 NOTE — PROGRESS NOTES
Patient ID: Harle Opitz is a 45 y.o. female who is being seen today for   Chief Complaint   Patient presents with    Sleep Apnea     1 year sleep         HPI:   Harle Opitz is a 45 y.o. female in office for KORY follow up. States she is doing well with BIPAP Patient is using BiPAP   6-7hrs/night. Using humidifier. No snoring on BiPAP. The pressure is well tolerated. The mask is comfortable- nasal pillows. No mask leak. No significant daytime sleepiness. No nodding off when driving. No dry nose or throat. No fatigue. Bedtime is MN and rise time is 630 am. Sleep onset is 10-15 minutes. Wakes up 2-3 times at night total. 2-3 nocturia. It takes few minutes to fall back a sleep. Occasional naps during the day, might doze in the evenings. No headache in am. No weight gain. 30 oz caffienated beverages during the day. No alcohol.  ESS is 9        Sleep Medicine 1/5/2023 12/13/2021 12/7/2020 12/9/2019 12/4/2018 12/4/2018 12/1/2017   Sitting and reading 3 1 1 2 1 1 1   Watching TV 3 0 1 0 2 2 2   Sitting, inactive in a public place (e.g. a theatre or a meeting) 0 0 0 0 1 1 3   As a passenger in a car for an hour without a break 0 0 0 0 0 0 0   Lying down to rest in the afternoon when circumstances permit 3 2 0 3 3 3 3   Sitting and talking to someone 0 0 0 0 1 1 0   Sitting quietly after a lunch without alcohol 0 0 0 0 3 3 1   In a car, while stopped for a few minutes in traffic 0 0 0 0 0 0 0   Hen[place] Sleepiness Score 9 3 2 5 11 11 10   Neck circumference (Inches) 16 18.5 - 18.5 - 18.5 19       Past Medical History:  Past Medical History:   Diagnosis Date    Acid reflux     Diabetes mellitus ([address]     Hyperlipidemia     Hypertension     KORY treated with BiPAP        Past Surgical History:        Procedure Laterality Date    ARM SURGERY      L arm    CARDIAC CATHETERIZATION  05/2020    CHOLECYSTECTOMY      HERNIA REPAIR      HYSTERECTOMY (CERVIX STATUS UNKNOWN)      OTHER SURGICAL HISTORY Left     surgery on left elbow    [place] SURGICAL ARTHROSCOPY SHOULDER W/ROTATOR CUFF RPR Left 10/3/2018    SHOULDER ARTHROSCOPY, ROTATOR CUFF REPAIR, SUBACROMIAL DECOMPRESSION performed by John Angel DO at [address] ARTHROSCOPY Left 10/03/2018    ROTATOR CUFF REPAIR, ROTATION MEDICAL GRAFT, SUBACROMIAL DECOMPRESSION     THYROIDECTOMY, PARTIAL Right 10/23/2020    US THYROID BIOPSY  8/28/2020     THYROID BIOPSY 8/28/2020 [facility] ULTRASOUND    VENTRAL HERNIA REPAIR N/A 9/4/14    REPAIR OF INCARCERATED VENTRAL HERNIA       Allergies:  is allergic to atorvastatin. Social History:    TOBACCO:   reports that she has been smoking cigarettes. She has a 34.00 pack-year smoking history. She has never used smokeless tobacco.  ETOH:   reports current alcohol use. Family History:   No family history on file.     Current Medications:    Current Outpatient Medications:     rosuvastatin (CRESTOR) 5 MG tablet, TAKE 1 TABLET DAILY, Disp: 90 tablet, Rfl: 3    levothyroxine (SYNTHROID) 75 MCG tablet, Take 75 mcg by mouth Daily, Disp: , Rfl:     Cholecalciferol (VITAMIN D) 50 MCG (2000 UT) CAPS capsule, Take by mouth, Disp: , Rfl:     magnesium (MAGNESIUM-OXIDE) 250 MG TABS tablet, Take 250 mg by mouth daily, Disp: , Rfl:     ferrous sulfate (IRON 325) 325 (65 Fe) MG tablet, Take 325 mg by mouth 3 times daily (with meals), Disp: , Rfl:     DICLOFENAC PO, Take by mouth, Disp: , Rfl:     aspirin 81 MG tablet, Take 81 mg by mouth daily, Disp: , Rfl:     ONE TOUCH ULTRA TEST strip, , Disp: , Rfl:     blood glucose test strips (ASCENSIA AUTODISC VI;ONE TOUCH ULTRA TEST VI) strip, Check Blood glucose  Twice Daily, Disp: , Rfl:     budesonide-formoterol (SYMBICORT) 80-4.5 MCG/ACT AERO, USE 2 INHALATIONS ORALLY   TWICE DAILY, Disp: , Rfl:     Probiotic Product (PROBIOTIC DAILY PO), Take 1 capsule by mouth daily, Disp: , Rfl:     Coenzyme Q10 (CO Q-10) 100 MG CAPS, Take 100 mg by mouth daily, Disp: , Rfl:     albuterol sulfate  (90 Base) MCG/ACT inhaler, Inhale 1-2 puffs into the lungs, Disp: , Rfl:     metFORMIN (GLUCOPHAGE) 1000 MG tablet, Take 500 mg by mouth 2 times daily (with meals) 500 AM, 500 PM, Disp: , Rfl:     montelukast (SINGULAIR) 10 MG tablet, Take 10 mg by mouth nightly., Disp: , Rfl:     levocetirizine (XYZAL) 5 MG tablet, Take 5 mg by mouth nightly., Disp: , Rfl:     Omega-3 Fatty Acids (FISH OIL) 1000 MG CAPS, Take 6,000 mg by mouth daily. , Disp: , Rfl:     Glucosamine-Chondroit-Vit C-Mn (GLUCOSAMINE CHONDR 500 COMPLEX PO), Take 1 tablet by mouth daily. , Disp: , Rfl:     Pyridoxine HCl (VITAMIN B-6) 100 MG tablet, Take 200 mg by mouth daily. , Disp: , Rfl:     Ascorbic Acid (VITAMIN C) 250 MG tablet, Take 1,000 mg by mouth daily , Disp: , Rfl:     ESOMEPRAZOLE MAGNESIUM PO, Take by mouth, Disp: , Rfl:       Objective:   PHYSICAL EXAM:    /80   Pulse 76   Temp 96.8 °F (36 °C)   Resp 17   Ht 5' 5\" (1.651 m)   Wt 269 lb (122 kg)   SpO2 95% Comment: RA  BMI 44.76 kg/m²   Gen: No acute distress. Obese. BMI of 44.76  Eyes: PERRL. No sclera icterus. No conjunctival injection. ENT: No discharge. Neck: Trachea midline. No obvious mass. Neck circumference 16\"  Resp: No accessory muscle use. No crackles. No wheezes. No rhonchi. CV: Regular rate. Regular rhythm. No murmur or rub. Skin: Warm and dry. M/S: No cyanosis. No obvious joint deformity. Neuro: Awake. Alert. Moves all four extremities. Psych: Oriented x 3. No anxiety. DATA:   11/7/2006 PSG AHI 74.3, low SPO2 51%  11/10/2006 titration recommendations BiPAP 20/13 cm H2O  10/6/2017 titration recommendations BiPAP 14/10 cm H2O    BiPAP compliance data:  Compliance download report from 11/8/19 to 12/7/19 showed patient is using machine 7:09 hrs/night with 100% compliance and AHI 0.9 within this time frame. 30/30days with greater than 4 hours of machine use.   BIPAP 14/10 cm H20    Compliance download report from 11/2/20 to 12/1/20 reviewed today by me and showed patient is using machine 6:58 hrs/night with 97% compliance and AHI 0.7 within this time frame. 29/30days with greater than 4 hours of machine use. BIPAP 14/10 cm H20     Compliance download report from 11/8/21 to 12/7/21 reviewed today by me and showed patient is using machine 7:27 hrs/night with 100% compliance and AHI 0.8 within this time frame. 30/30days with greater than 4 hours of machine use. BIPAP 14/10 cm H20      Compliance download report from 12/4/22 to 1/2/23 reviewed today by me and showed patient is using machine 6:17 hrs/night with 100% compliance and AHI 1.0 within this time frame. 30/30days with greater than 4 hours of machine use. BIPAP 14/10 cm H20       Assessment:      Severe KORY. BiPAP 14/10 cm H2O. Optimal compliance and efficacy on review today  Hypersomnia-improved with BiPAP  Sleep onset insomnia-psychophysiological hyperarousal, poor sleep hygiene likely contributing-improved  Obesity  Snoring resolved on BiPAP  Comorbid conditions: DM and HTN    Plan:       -Continue BiPAP 14/10 cm H2O.  -Discussed recommended cleaning and replacement of BiPAP supplies  - Advised to use CPAP 6-8 hrs at night and during naps. - Replacement of mask, tubing, head straps every 3-6 months or sooner if damaged. - Patient instructed to contact [employer] company for any mask, tubing or machine trouble shooting if problems arise.  - Sleep hygiene  -Cognitive behavioral therapy was discussed with patient including stimulus control and sleep restriction, worry period before bed. Recommend go to bed earlier  - Avoid sedatives, alcohol and caffeinated drinks at bed time. - Patient counseled to never drive or operate heavy machinery while fatigue, drowsy or sleepy.    - Weight loss is recommended as a long-term intervention.    - Complications of KORY if not treated were discussed with patient patient, including: systemic hypertension, pulmonary hypertension, cardiovascular morbidities, car accidents and all cause mortality.  -Patient education regarding sleep tips and CPAP cleaning recommendations     Follow up: 1 year, sooner if needed

## 2023-06-06 ENCOUNTER — HOSPITAL ENCOUNTER (OUTPATIENT)
Dept: WOMENS IMAGING | Age: 53
Discharge: HOME | End: 2023-06-06
Payer: COMMERCIAL

## 2023-06-06 DIAGNOSIS — Z12.31 SCREENING MAMMOGRAM, ENCOUNTER FOR: ICD-10-CM

## 2023-06-06 PROCEDURE — 77063 BREAST TOMOSYNTHESIS BI: CPT

## 2024-01-04 ENCOUNTER — OFFICE VISIT (OUTPATIENT)
Dept: SLEEP MEDICINE | Age: 54
End: 2024-01-04
Payer: COMMERCIAL

## 2024-01-04 ENCOUNTER — TELEPHONE (OUTPATIENT)
Dept: PULMONOLOGY | Age: 54
End: 2024-01-04

## 2024-01-04 VITALS
HEART RATE: 80 BPM | WEIGHT: 271.4 LBS | OXYGEN SATURATION: 96 % | HEIGHT: 65 IN | DIASTOLIC BLOOD PRESSURE: 76 MMHG | SYSTOLIC BLOOD PRESSURE: 132 MMHG | BODY MASS INDEX: 45.22 KG/M2

## 2024-01-04 DIAGNOSIS — Z71.89 ENCOUNTER FOR BIPAP USE COUNSELING: ICD-10-CM

## 2024-01-04 DIAGNOSIS — Z72.0 TOBACCO ABUSE: ICD-10-CM

## 2024-01-04 DIAGNOSIS — R53.83 OTHER FATIGUE: ICD-10-CM

## 2024-01-04 DIAGNOSIS — E66.01 OBESITY, MORBID, BMI 40.0-49.9 (HCC): ICD-10-CM

## 2024-01-04 DIAGNOSIS — G47.33 SEVERE OBSTRUCTIVE SLEEP APNEA: Primary | ICD-10-CM

## 2024-01-04 DIAGNOSIS — I10 PRIMARY HYPERTENSION: ICD-10-CM

## 2024-01-04 PROCEDURE — 3078F DIAST BP <80 MM HG: CPT | Performed by: NURSE PRACTITIONER

## 2024-01-04 PROCEDURE — 99214 OFFICE O/P EST MOD 30 MIN: CPT | Performed by: NURSE PRACTITIONER

## 2024-01-04 PROCEDURE — 3075F SYST BP GE 130 - 139MM HG: CPT | Performed by: NURSE PRACTITIONER

## 2024-01-04 RX ORDER — LOSARTAN POTASSIUM 50 MG/1
50 TABLET ORAL DAILY
COMMUNITY
Start: 2023-12-12

## 2024-01-04 RX ORDER — BUPROPION HYDROCHLORIDE 150 MG/1
150 TABLET, EXTENDED RELEASE ORAL 2 TIMES DAILY
COMMUNITY
Start: 2023-11-24

## 2024-01-04 RX ORDER — AMOXICILLIN AND CLAVULANATE POTASSIUM 875; 125 MG/1; MG/1
1 TABLET, FILM COATED ORAL 2 TIMES DAILY
COMMUNITY
Start: 2023-12-31 | End: 2024-01-10

## 2024-01-04 ASSESSMENT — SLEEP AND FATIGUE QUESTIONNAIRES
HOW LIKELY ARE YOU TO NOD OFF OR FALL ASLEEP WHILE SITTING QUIETLY AFTER LUNCH WITHOUT ALCOHOL: 2
HOW LIKELY ARE YOU TO NOD OFF OR FALL ASLEEP WHILE SITTING INACTIVE IN A PUBLIC PLACE: 0
HOW LIKELY ARE YOU TO NOD OFF OR FALL ASLEEP WHILE SITTING AND TALKING TO SOMEONE: 0
HOW LIKELY ARE YOU TO NOD OFF OR FALL ASLEEP IN A CAR, WHILE STOPPED FOR A FEW MINUTES IN TRAFFIC: 0
HOW LIKELY ARE YOU TO NOD OFF OR FALL ASLEEP WHILE SITTING AND READING: 3
HOW LIKELY ARE YOU TO NOD OFF OR FALL ASLEEP WHEN YOU ARE A PASSENGER IN A CAR FOR AN HOUR WITHOUT A BREAK: 0
HOW LIKELY ARE YOU TO NOD OFF OR FALL ASLEEP WHILE WATCHING TV: 2
HOW LIKELY ARE YOU TO NOD OFF OR FALL ASLEEP WHILE LYING DOWN TO REST IN THE AFTERNOON WHEN CIRCUMSTANCES PERMIT: 2
ESS TOTAL SCORE: 9
NECK CIRCUMFERENCE (INCHES): 17.5

## 2024-01-04 NOTE — TELEPHONE ENCOUNTER
Faxed orders for bipap and mask to [facility] at [phone] via [employer].

## 2024-01-04 NOTE — PATIENT INSTRUCTIONS
Here are some tips to to getting better sleep  1- Avoid napping during the day: This will ensure you are tired at bedtime. If you have to take a nap, sleep less than one hour, before 3 pm.   2- Exercise regularly, but not right before bed: but the timing of the workout is important. Exercising in the morning or early afternoon will not interfere with sleep.  Exercising within two hours before bedtime can decrease your ability to fall asleep.  Regular exercise is recommended to help you deepen the sleep.   3- Avoid heavy, spicy, or sugary foods 4-6 hours before bedtime: These can affect your ability to stay asleep.  4- Have a light snack before bed: Having an empty stomach can interfere with your sleep. Dairy products and turkey contain tryptophan, which acts as a natural sleep inducer.   5- Stay away from caffeine, nicotine and alcohol at least 4-6 hours before bed: Caffeine and nicotine are stimulants that interfere with your ability to fall asleep. While alcohol has an immediate sleep-inducing effect, a few hours later, as alcohol levels in your blood start to fall, there is a stimulant effect and you will experience fragmented sleep.   6- Take a hot bath 90 minutes before bedtime:  A hot bath will raise your body temperature, but it is the drop in body temperature that may leave you feeling sleepy  7- Develop sleep rituals: it is important to give your body cues that it is time to slow down and sleep. Listen to relaxing music, read something soothing for 15 minutes, have a cup of caffeine free tea, or do relaxation exercises such as yoga or deep breathing help relieve anxiety and reduce muscle tension.   8- Fix a bedtime and an awakening time: Even on weekends! When your sleep cycle has a regular rhythm, you will feel better.   9- Sleep only when sleepy: This reduces the time you are awake in bed.   10- Get into your favorite sleeping position: If you can't fall asleep within 15-30 minutes, get up and do

## 2024-10-22 ENCOUNTER — HOSPITAL ENCOUNTER (OUTPATIENT)
Dept: WOMENS IMAGING | Age: 54
Discharge: HOME | End: 2024-10-22
Payer: COMMERCIAL

## 2024-10-22 VITALS — HEIGHT: 65 IN | BODY MASS INDEX: 39.15 KG/M2 | WEIGHT: 235 LBS

## 2024-10-22 DIAGNOSIS — Z12.31 VISIT FOR SCREENING MAMMOGRAM: ICD-10-CM

## 2024-10-22 PROCEDURE — 77063 BREAST TOMOSYNTHESIS BI: CPT

## 2024-12-30 ENCOUNTER — HOSPITAL ENCOUNTER (INPATIENT)
Age: 54
LOS: 1 days | Discharge: HOME | DRG: 660 | End: 2024-12-31
Attending: INTERNAL MEDICINE | Admitting: INTERNAL MEDICINE
Payer: COMMERCIAL

## 2024-12-30 ENCOUNTER — APPOINTMENT (OUTPATIENT)
Dept: CT IMAGING | Age: 54
DRG: 660 | End: 2024-12-30
Payer: COMMERCIAL

## 2024-12-30 DIAGNOSIS — N17.9 AKI (ACUTE KIDNEY INJURY) (HCC): ICD-10-CM

## 2024-12-30 DIAGNOSIS — N12 PYELONEPHRITIS: Primary | ICD-10-CM

## 2024-12-30 DIAGNOSIS — N13.2 HYDRONEPHROSIS WITH URINARY OBSTRUCTION DUE TO RENAL CALCULUS: ICD-10-CM

## 2024-12-30 DIAGNOSIS — R11.2 NAUSEA VOMITING AND DIARRHEA: ICD-10-CM

## 2024-12-30 DIAGNOSIS — R19.7 NAUSEA VOMITING AND DIARRHEA: ICD-10-CM

## 2024-12-30 PROBLEM — N13.9 OBSTRUCTIVE UROPATHY: Status: ACTIVE | Noted: 2024-12-30

## 2024-12-30 LAB
ALBUMIN SERPL-MCNC: 4.3 G/DL (ref 3.4–5)
ALBUMIN/GLOB SERPL: 1.1 {RATIO} (ref 1.1–2.2)
ALP SERPL-CCNC: 100 U/L (ref 40–129)
ALT SERPL-CCNC: 22 U/L (ref 10–40)
ANION GAP SERPL CALCULATED.3IONS-SCNC: 16 MMOL/L (ref 3–16)
AST SERPL-CCNC: 29 U/L (ref 15–37)
BASOPHILS # BLD: 0 K/UL (ref 0–0.2)
BASOPHILS NFR BLD: 0.2 %
BILIRUB SERPL-MCNC: 0.8 MG/DL (ref 0–1)
BILIRUB UR QL STRIP.AUTO: NEGATIVE
BUN SERPL-MCNC: 60 MG/DL (ref 7–20)
CALCIUM SERPL-MCNC: 8.6 MG/DL (ref 8.3–10.6)
CHARACTER UR: ABNORMAL
CHLORIDE SERPL-SCNC: 88 MMOL/L (ref 99–110)
CLARITY UR: ABNORMAL
CO2 SERPL-SCNC: 25 MMOL/L (ref 21–32)
COLOR UR: YELLOW
CREAT SERPL-MCNC: 1.4 MG/DL (ref 0.6–1.1)
DEPRECATED RDW RBC AUTO: 14.5 % (ref 12.4–15.4)
EOSINOPHIL # BLD: 0 K/UL (ref 0–0.6)
EOSINOPHIL NFR BLD: 0.3 %
FLUAV RNA RESP QL NAA+PROBE: NOT DETECTED
FLUBV RNA RESP QL NAA+PROBE: NOT DETECTED
GFR SERPLBLD CREATININE-BSD FMLA CKD-EPI: 44 ML/MIN/{1.73_M2}
GLUCOSE BLD-MCNC: 124 MG/DL (ref 70–99)
GLUCOSE SERPL-MCNC: 148 MG/DL (ref 70–99)
GLUCOSE UR STRIP.AUTO-MCNC: NEGATIVE MG/DL
HCT VFR BLD AUTO: 49.5 % (ref 36–48)
HGB BLD-MCNC: 17.3 G/DL (ref 12–16)
HGB UR QL STRIP.AUTO: ABNORMAL
KETONES UR STRIP.AUTO-MCNC: NEGATIVE MG/DL
LACTATE BLDV-SCNC: 1.8 MMOL/L (ref 0.4–2)
LEUKOCYTE ESTERASE UR QL STRIP.AUTO: ABNORMAL
LIPASE SERPL-CCNC: 36 U/L (ref 13–60)
LYMPHOCYTES # BLD: 1.6 K/UL (ref 1–5.1)
LYMPHOCYTES NFR BLD: 12.6 %
MAGNESIUM SERPL-MCNC: 2.09 MG/DL (ref 1.8–2.4)
MCH RBC QN AUTO: 32.1 PG (ref 26–34)
MCHC RBC AUTO-ENTMCNC: 34.9 G/DL (ref 31–36)
MCV RBC AUTO: 92.1 FL (ref 80–100)
MONOCYTES # BLD: 1 K/UL (ref 0–1.3)
MONOCYTES NFR BLD: 7.4 %
NEUTROPHILS # BLD: 10.3 K/UL (ref 1.7–7.7)
NEUTROPHILS NFR BLD: 79.5 %
NITRITE UR QL STRIP.AUTO: NEGATIVE
PERFORMED ON: ABNORMAL
PH UR STRIP.AUTO: 6 [PH] (ref 5–8)
PLATELET # BLD AUTO: 298 K/UL (ref 135–450)
PMV BLD AUTO: 7.9 FL (ref 5–10.5)
POTASSIUM SERPL-SCNC: 3.4 MMOL/L (ref 3.5–5.1)
PROT SERPL-MCNC: 8.2 G/DL (ref 6.4–8.2)
PROT UR STRIP.AUTO-MCNC: 100 MG/DL
RBC # BLD AUTO: 5.38 M/UL (ref 4–5.2)
RBC #/AREA URNS HPF: ABNORMAL /HPF (ref 0–4)
SARS-COV-2 RNA RESP QL NAA+PROBE: NOT DETECTED
SODIUM SERPL-SCNC: 129 MMOL/L (ref 136–145)
SP GR UR STRIP.AUTO: >=1.03 (ref 1–1.03)
UA COMPLETE W REFLEX CULTURE PNL UR: YES
UA DIPSTICK W REFLEX MICRO PNL UR: YES
URN SPEC COLLECT METH UR: ABNORMAL
UROBILINOGEN UR STRIP-ACNC: 0.2 E.U./DL
WBC # BLD AUTO: 13 K/UL (ref 4–11)
WBC #/AREA URNS HPF: >100 /HPF (ref 0–5)

## 2024-12-30 PROCEDURE — 83735 ASSAY OF MAGNESIUM: CPT

## 2024-12-30 PROCEDURE — 0T778DZ DILATION OF LEFT URETER WITH INTRALUMINAL DEVICE, VIA NATURAL OR ARTIFICIAL OPENING ENDOSCOPIC: ICD-10-PCS | Performed by: UROLOGY

## 2024-12-30 PROCEDURE — 36415 COLL VENOUS BLD VENIPUNCTURE: CPT

## 2024-12-30 PROCEDURE — 2580000003 HC RX 258: Performed by: INTERNAL MEDICINE

## 2024-12-30 PROCEDURE — 87636 SARSCOV2 & INF A&B AMP PRB: CPT

## 2024-12-30 PROCEDURE — 83605 ASSAY OF LACTIC ACID: CPT

## 2024-12-30 PROCEDURE — 87077 CULTURE AEROBIC IDENTIFY: CPT

## 2024-12-30 PROCEDURE — 6360000002 HC RX W HCPCS: Performed by: INTERNAL MEDICINE

## 2024-12-30 PROCEDURE — 85025 COMPLETE CBC W/AUTO DIFF WBC: CPT

## 2024-12-30 PROCEDURE — 2580000003 HC RX 258: Performed by: PHYSICIAN ASSISTANT

## 2024-12-30 PROCEDURE — 2500000003 HC RX 250 WO HCPCS: Performed by: INTERNAL MEDICINE

## 2024-12-30 PROCEDURE — 87186 SC STD MICRODIL/AGAR DIL: CPT

## 2024-12-30 PROCEDURE — 96375 TX/PRO/DX INJ NEW DRUG ADDON: CPT

## 2024-12-30 PROCEDURE — 83690 ASSAY OF LIPASE: CPT

## 2024-12-30 PROCEDURE — 80053 COMPREHEN METABOLIC PANEL: CPT

## 2024-12-30 PROCEDURE — BT1D1ZZ FLUOROSCOPY OF RIGHT KIDNEY, URETER AND BLADDER USING LOW OSMOLAR CONTRAST: ICD-10-PCS | Performed by: UROLOGY

## 2024-12-30 PROCEDURE — 6370000000 HC RX 637 (ALT 250 FOR IP): Performed by: PHYSICIAN ASSISTANT

## 2024-12-30 PROCEDURE — 6360000002 HC RX W HCPCS: Performed by: PHYSICIAN ASSISTANT

## 2024-12-30 PROCEDURE — 6370000000 HC RX 637 (ALT 250 FOR IP): Performed by: INTERNAL MEDICINE

## 2024-12-30 PROCEDURE — 99285 EMERGENCY DEPT VISIT HI MDM: CPT

## 2024-12-30 PROCEDURE — 87040 BLOOD CULTURE FOR BACTERIA: CPT

## 2024-12-30 PROCEDURE — 96374 THER/PROPH/DIAG INJ IV PUSH: CPT

## 2024-12-30 PROCEDURE — 74176 CT ABD & PELVIS W/O CONTRAST: CPT

## 2024-12-30 PROCEDURE — 81001 URINALYSIS AUTO W/SCOPE: CPT

## 2024-12-30 PROCEDURE — 1200000000 HC SEMI PRIVATE

## 2024-12-30 PROCEDURE — 87086 URINE CULTURE/COLONY COUNT: CPT

## 2024-12-30 RX ORDER — ONDANSETRON 2 MG/ML
4 INJECTION INTRAMUSCULAR; INTRAVENOUS EVERY 6 HOURS PRN
Status: DISCONTINUED | OUTPATIENT
Start: 2024-12-30 | End: 2024-12-31 | Stop reason: HOSPADM

## 2024-12-30 RX ORDER — BUDESONIDE AND FORMOTEROL FUMARATE DIHYDRATE 80; 4.5 UG/1; UG/1
2 AEROSOL RESPIRATORY (INHALATION)
Status: DISCONTINUED | OUTPATIENT
Start: 2024-12-30 | End: 2024-12-31

## 2024-12-30 RX ORDER — POTASSIUM CHLORIDE 7.45 MG/ML
10 INJECTION INTRAVENOUS PRN
Status: DISCONTINUED | OUTPATIENT
Start: 2024-12-30 | End: 2024-12-31 | Stop reason: HOSPADM

## 2024-12-30 RX ORDER — ENOXAPARIN SODIUM 100 MG/ML
40 INJECTION SUBCUTANEOUS DAILY
Status: DISCONTINUED | OUTPATIENT
Start: 2024-12-30 | End: 2024-12-31 | Stop reason: HOSPADM

## 2024-12-30 RX ORDER — SODIUM CHLORIDE 0.9 % (FLUSH) 0.9 %
5-40 SYRINGE (ML) INJECTION EVERY 12 HOURS SCHEDULED
Status: DISCONTINUED | OUTPATIENT
Start: 2024-12-30 | End: 2024-12-31 | Stop reason: HOSPADM

## 2024-12-30 RX ORDER — ONDANSETRON 4 MG/1
4 TABLET, ORALLY DISINTEGRATING ORAL EVERY 8 HOURS PRN
Status: DISCONTINUED | OUTPATIENT
Start: 2024-12-30 | End: 2024-12-31 | Stop reason: HOSPADM

## 2024-12-30 RX ORDER — SODIUM CHLORIDE 9 MG/ML
INJECTION, SOLUTION INTRAVENOUS CONTINUOUS
Status: DISCONTINUED | OUTPATIENT
Start: 2024-12-30 | End: 2024-12-31 | Stop reason: HOSPADM

## 2024-12-30 RX ORDER — METOCLOPRAMIDE HYDROCHLORIDE 5 MG/ML
10 INJECTION INTRAMUSCULAR; INTRAVENOUS ONCE
Status: COMPLETED | OUTPATIENT
Start: 2024-12-30 | End: 2024-12-30

## 2024-12-30 RX ORDER — INSULIN LISPRO 100 [IU]/ML
0-4 INJECTION, SOLUTION INTRAVENOUS; SUBCUTANEOUS
Status: DISCONTINUED | OUTPATIENT
Start: 2024-12-30 | End: 2024-12-31 | Stop reason: HOSPADM

## 2024-12-30 RX ORDER — POTASSIUM CHLORIDE 1500 MG/1
40 TABLET, EXTENDED RELEASE ORAL PRN
Status: DISCONTINUED | OUTPATIENT
Start: 2024-12-30 | End: 2024-12-31 | Stop reason: HOSPADM

## 2024-12-30 RX ORDER — 0.9 % SODIUM CHLORIDE 0.9 %
1000 INTRAVENOUS SOLUTION INTRAVENOUS ONCE
Status: COMPLETED | OUTPATIENT
Start: 2024-12-30 | End: 2024-12-30

## 2024-12-30 RX ORDER — POLYETHYLENE GLYCOL 3350 17 G/17G
17 POWDER, FOR SOLUTION ORAL DAILY PRN
Status: DISCONTINUED | OUTPATIENT
Start: 2024-12-30 | End: 2024-12-31 | Stop reason: HOSPADM

## 2024-12-30 RX ORDER — BUPROPION HYDROCHLORIDE 150 MG/1
150 TABLET, EXTENDED RELEASE ORAL 2 TIMES DAILY
Status: DISCONTINUED | OUTPATIENT
Start: 2024-12-30 | End: 2024-12-31 | Stop reason: HOSPADM

## 2024-12-30 RX ORDER — ONDANSETRON 2 MG/ML
8 INJECTION INTRAMUSCULAR; INTRAVENOUS ONCE
Status: DISCONTINUED | OUTPATIENT
Start: 2024-12-30 | End: 2024-12-30

## 2024-12-30 RX ORDER — DICLOFENAC SODIUM 75 MG/1
TABLET, DELAYED RELEASE ORAL 2 TIMES DAILY
COMMUNITY
Start: 2024-11-09

## 2024-12-30 RX ORDER — ROSUVASTATIN CALCIUM 20 MG/1
TABLET, COATED ORAL
COMMUNITY
Start: 2024-12-07

## 2024-12-30 RX ORDER — SODIUM CHLORIDE 9 MG/ML
INJECTION, SOLUTION INTRAVENOUS PRN
Status: DISCONTINUED | OUTPATIENT
Start: 2024-12-30 | End: 2024-12-31 | Stop reason: HOSPADM

## 2024-12-30 RX ORDER — SEMAGLUTIDE 2.68 MG/ML
INJECTION, SOLUTION SUBCUTANEOUS
COMMUNITY
Start: 2024-09-11

## 2024-12-30 RX ORDER — ACETAMINOPHEN 650 MG/1
650 SUPPOSITORY RECTAL EVERY 6 HOURS PRN
Status: DISCONTINUED | OUTPATIENT
Start: 2024-12-30 | End: 2024-12-31 | Stop reason: HOSPADM

## 2024-12-30 RX ORDER — ACETAMINOPHEN 325 MG/1
650 TABLET ORAL EVERY 6 HOURS PRN
Status: DISCONTINUED | OUTPATIENT
Start: 2024-12-30 | End: 2024-12-31 | Stop reason: HOSPADM

## 2024-12-30 RX ORDER — DIPHENHYDRAMINE HYDROCHLORIDE 50 MG/ML
25 INJECTION INTRAMUSCULAR; INTRAVENOUS ONCE
Status: COMPLETED | OUTPATIENT
Start: 2024-12-30 | End: 2024-12-30

## 2024-12-30 RX ORDER — POTASSIUM CHLORIDE 750 MG/1
40 TABLET, EXTENDED RELEASE ORAL ONCE
Status: COMPLETED | OUTPATIENT
Start: 2024-12-30 | End: 2024-12-30

## 2024-12-30 RX ORDER — DEXTROSE MONOHYDRATE 100 MG/ML
INJECTION, SOLUTION INTRAVENOUS CONTINUOUS PRN
Status: DISCONTINUED | OUTPATIENT
Start: 2024-12-30 | End: 2024-12-31 | Stop reason: HOSPADM

## 2024-12-30 RX ORDER — GLUCAGON 1 MG/ML
1 KIT INJECTION PRN
Status: DISCONTINUED | OUTPATIENT
Start: 2024-12-30 | End: 2024-12-31 | Stop reason: HOSPADM

## 2024-12-30 RX ORDER — MAGNESIUM SULFATE IN WATER 40 MG/ML
2000 INJECTION, SOLUTION INTRAVENOUS PRN
Status: DISCONTINUED | OUTPATIENT
Start: 2024-12-30 | End: 2024-12-31 | Stop reason: HOSPADM

## 2024-12-30 RX ORDER — SODIUM CHLORIDE 0.9 % (FLUSH) 0.9 %
5-40 SYRINGE (ML) INJECTION PRN
Status: DISCONTINUED | OUTPATIENT
Start: 2024-12-30 | End: 2024-12-31 | Stop reason: HOSPADM

## 2024-12-30 RX ADMIN — SODIUM CHLORIDE 1000 ML: 9 INJECTION, SOLUTION INTRAVENOUS at 14:29

## 2024-12-30 RX ADMIN — CEFTRIAXONE SODIUM 2000 MG: 2 INJECTION, POWDER, FOR SOLUTION INTRAMUSCULAR; INTRAVENOUS at 17:52

## 2024-12-30 RX ADMIN — Medication 10 ML: at 22:09

## 2024-12-30 RX ADMIN — POTASSIUM CHLORIDE 40 MEQ: 750 TABLET, EXTENDED RELEASE ORAL at 14:28

## 2024-12-30 RX ADMIN — ENOXAPARIN SODIUM 40 MG: 100 INJECTION SUBCUTANEOUS at 22:08

## 2024-12-30 RX ADMIN — METOCLOPRAMIDE 10 MG: 5 INJECTION, SOLUTION INTRAMUSCULAR; INTRAVENOUS at 14:01

## 2024-12-30 RX ADMIN — BUPROPION HYDROCHLORIDE 150 MG: 150 TABLET, EXTENDED RELEASE ORAL at 22:08

## 2024-12-30 RX ADMIN — SODIUM CHLORIDE 1000 ML: 9 INJECTION, SOLUTION INTRAVENOUS at 13:58

## 2024-12-30 RX ADMIN — DIPHENHYDRAMINE HYDROCHLORIDE 25 MG: 50 INJECTION INTRAMUSCULAR; INTRAVENOUS at 13:59

## 2024-12-30 RX ADMIN — SODIUM CHLORIDE: 9 INJECTION, SOLUTION INTRAVENOUS at 22:07

## 2024-12-30 ASSESSMENT — PAIN DESCRIPTION - LOCATION: LOCATION: FLANK

## 2024-12-30 ASSESSMENT — PAIN - FUNCTIONAL ASSESSMENT
PAIN_FUNCTIONAL_ASSESSMENT: PREVENTS OR INTERFERES SOME ACTIVE ACTIVITIES AND ADLS
PAIN_FUNCTIONAL_ASSESSMENT: 0-10

## 2024-12-30 ASSESSMENT — PAIN DESCRIPTION - ORIENTATION: ORIENTATION: RIGHT

## 2024-12-30 ASSESSMENT — PAIN DESCRIPTION - PAIN TYPE: TYPE: ACUTE PAIN

## 2024-12-30 ASSESSMENT — PAIN SCALES - GENERAL: PAINLEVEL_OUTOF10: 1

## 2024-12-30 NOTE — PLAN OF CARE
54-year-old female with 4-day history of nausea vomiting diarrhea.     Workup  CT showed 5 mm left ureteral stone, with hydroureter, UA - + RBC.   Urology consulted for obstructive uropathy.   IV fluids, empiric IV antibiotics Rocephin.  N.p.o. after midnight for cystoscopy.     Patient dehydrated, creatinine 1.4, hyponatremic sodium 129, hemoglobin and hematocrit elevated, mild leukocytosis of 13- continue IV fluids   DM - SSI

## 2024-12-30 NOTE — ED PROVIDER NOTES
[facility] ED  EMERGENCY DEPARTMENT ENCOUNTER        Pt Name: Savi Mays  MRN: [id number]  Birthdate 1970  Date of evaluation: 12/30/2024  Provider: Megha Horton PA-C  PCP: Noreen Silva APRN - CNP  Note Started: 4:42 PM EST 12/30/24       I have seen and evaluated this patient with my supervising physician Dr. Strong      CHIEF COMPLAINT       Chief Complaint   Patient presents with    Nausea     N/v/d for 4 days.  Went to [facility].  Told to come to ed.  Has had a uti / finished anx.  Not any better       HISTORY OF PRESENT ILLNESS: 1 or more Elements     History From: Patient and family            Chief Complaint: Nausea vomiting diarrhea    Savi Mays is a 54 y.o. female who presents for 4 days patient has had nausea vomiting and diarrhea intermittently.  She states she was feeling better yesterday and then her symptoms returned today when she went back to work.  She is denying any cough chest pain shortness of breath congestion sore throat fever or abdominal pain.  No new back pain.  She states she is urinating without frequency dark urine hematuria or dysuria.  She states she has been off and on antibiotics recently for a UTI by her doctor.  She is a diabetic and she is concerned because she feels dehydrated.  She has had a history of hysterectomy cholecystectomy    Nursing Notes were all reviewed and agreed with or any disagreements were addressed in the HPI.    REVIEW OF SYSTEMS :      Review of Systems    Positives and Pertinent negatives as per HPI.     SURGICAL HISTORY     Past Surgical History:   Procedure Laterality Date    ARM SURGERY      L arm    CARDIAC CATHETERIZATION  05/2020    CHOLECYSTECTOMY      HERNIA REPAIR      HYSTERECTOMY (CERVIX STATUS UNKNOWN)      OTHER SURGICAL HISTORY Left     surgery on left elbow    OVARY REMOVAL      [place] SURGICAL ARTHROSCOPY SHOULDER W/ROTATOR CUFF RPR Left 10/03/2018    SHOULDER ARTHROSCOPY, ROTATOR CUFF REPAIR,

## 2024-12-30 NOTE — ED NOTES
1617 Call placed to urology for stat consult  1703 Urology called back and spoke to Megha Horton PA-C

## 2024-12-31 ENCOUNTER — ANESTHESIA (OUTPATIENT)
Dept: OPERATING ROOM | Age: 54
DRG: 660 | End: 2024-12-31
Payer: COMMERCIAL

## 2024-12-31 ENCOUNTER — ANESTHESIA EVENT (OUTPATIENT)
Dept: OPERATING ROOM | Age: 54
DRG: 660 | End: 2024-12-31
Payer: COMMERCIAL

## 2024-12-31 ENCOUNTER — APPOINTMENT (OUTPATIENT)
Dept: GENERAL RADIOLOGY | Age: 54
DRG: 660 | End: 2024-12-31
Payer: COMMERCIAL

## 2024-12-31 VITALS
HEIGHT: 65 IN | DIASTOLIC BLOOD PRESSURE: 73 MMHG | BODY MASS INDEX: 36.64 KG/M2 | HEART RATE: 69 BPM | SYSTOLIC BLOOD PRESSURE: 110 MMHG | OXYGEN SATURATION: 94 % | RESPIRATION RATE: 16 BRPM | TEMPERATURE: 98 F | WEIGHT: 219.9 LBS

## 2024-12-31 PROBLEM — N13.2 HYDRONEPHROSIS WITH URINARY OBSTRUCTION DUE TO RENAL CALCULUS: Status: ACTIVE | Noted: 2024-12-31

## 2024-12-31 PROBLEM — R19.7 NAUSEA VOMITING AND DIARRHEA: Status: ACTIVE | Noted: 2024-12-31

## 2024-12-31 PROBLEM — N17.9 AKI (ACUTE KIDNEY INJURY) (HCC): Status: ACTIVE | Noted: 2024-12-31

## 2024-12-31 PROBLEM — R11.2 NAUSEA VOMITING AND DIARRHEA: Status: ACTIVE | Noted: 2024-12-31

## 2024-12-31 LAB
ANION GAP SERPL CALCULATED.3IONS-SCNC: 8 MMOL/L (ref 3–16)
BASOPHILS # BLD: 0 K/UL (ref 0–0.2)
BASOPHILS NFR BLD: 0 %
BUN SERPL-MCNC: 31 MG/DL (ref 7–20)
CALCIUM SERPL-MCNC: 8.1 MG/DL (ref 8.3–10.6)
CHLORIDE SERPL-SCNC: 98 MMOL/L (ref 99–110)
CO2 SERPL-SCNC: 27 MMOL/L (ref 21–32)
CREAT SERPL-MCNC: 1 MG/DL (ref 0.6–1.1)
DEPRECATED RDW RBC AUTO: 14.7 % (ref 12.4–15.4)
EOSINOPHIL # BLD: 0 K/UL (ref 0–0.6)
EOSINOPHIL NFR BLD: 0.4 %
GFR SERPLBLD CREATININE-BSD FMLA CKD-EPI: 66 ML/MIN/{1.73_M2}
GLUCOSE BLD-MCNC: 107 MG/DL (ref 70–99)
GLUCOSE BLD-MCNC: 97 MG/DL (ref 70–99)
GLUCOSE SERPL-MCNC: 105 MG/DL (ref 70–99)
HCT VFR BLD AUTO: 46.3 % (ref 36–48)
HGB BLD-MCNC: 15.7 G/DL (ref 12–16)
LYMPHOCYTES # BLD: 1.8 K/UL (ref 1–5.1)
LYMPHOCYTES NFR BLD: 16.7 %
MCH RBC QN AUTO: 32 PG (ref 26–34)
MCHC RBC AUTO-ENTMCNC: 33.8 G/DL (ref 31–36)
MCV RBC AUTO: 94.5 FL (ref 80–100)
MONOCYTES # BLD: 0.8 K/UL (ref 0–1.3)
MONOCYTES NFR BLD: 7.5 %
NEUTROPHILS # BLD: 8.3 K/UL (ref 1.7–7.7)
NEUTROPHILS NFR BLD: 75.4 %
PERFORMED ON: ABNORMAL
PERFORMED ON: NORMAL
PLATELET # BLD AUTO: 252 K/UL (ref 135–450)
PMV BLD AUTO: 7.9 FL (ref 5–10.5)
POTASSIUM SERPL-SCNC: 3.7 MMOL/L (ref 3.5–5.1)
RBC # BLD AUTO: 4.9 M/UL (ref 4–5.2)
SODIUM SERPL-SCNC: 133 MMOL/L (ref 136–145)
WBC # BLD AUTO: 11 K/UL (ref 4–11)

## 2024-12-31 PROCEDURE — 7100000000 HC PACU RECOVERY - FIRST 15 MIN: Performed by: UROLOGY

## 2024-12-31 PROCEDURE — 36415 COLL VENOUS BLD VENIPUNCTURE: CPT

## 2024-12-31 PROCEDURE — 3700000001 HC ADD 15 MINUTES (ANESTHESIA): Performed by: UROLOGY

## 2024-12-31 PROCEDURE — 3600000004 HC SURGERY LEVEL 4 BASE: Performed by: UROLOGY

## 2024-12-31 PROCEDURE — 6370000000 HC RX 637 (ALT 250 FOR IP): Performed by: INTERNAL MEDICINE

## 2024-12-31 PROCEDURE — 94640 AIRWAY INHALATION TREATMENT: CPT

## 2024-12-31 PROCEDURE — C1758 CATHETER, URETERAL: HCPCS | Performed by: UROLOGY

## 2024-12-31 PROCEDURE — 94761 N-INVAS EAR/PLS OXIMETRY MLT: CPT

## 2024-12-31 PROCEDURE — 80048 BASIC METABOLIC PNL TOTAL CA: CPT

## 2024-12-31 PROCEDURE — 3700000000 HC ANESTHESIA ATTENDED CARE: Performed by: UROLOGY

## 2024-12-31 PROCEDURE — 99222 1ST HOSP IP/OBS MODERATE 55: CPT | Performed by: NURSE PRACTITIONER

## 2024-12-31 PROCEDURE — 2580000003 HC RX 258: Performed by: INTERNAL MEDICINE

## 2024-12-31 PROCEDURE — 6360000002 HC RX W HCPCS: Performed by: INTERNAL MEDICINE

## 2024-12-31 PROCEDURE — 85025 COMPLETE CBC W/AUTO DIFF WBC: CPT

## 2024-12-31 PROCEDURE — 83036 HEMOGLOBIN GLYCOSYLATED A1C: CPT

## 2024-12-31 PROCEDURE — 2709999900 HC NON-CHARGEABLE SUPPLY: Performed by: UROLOGY

## 2024-12-31 PROCEDURE — 2500000003 HC RX 250 WO HCPCS: Performed by: NURSE ANESTHETIST, CERTIFIED REGISTERED

## 2024-12-31 PROCEDURE — 3600000014 HC SURGERY LEVEL 4 ADDTL 15MIN: Performed by: UROLOGY

## 2024-12-31 PROCEDURE — 74420 UROGRAPHY RTRGR +-KUB: CPT

## 2024-12-31 PROCEDURE — 2500000003 HC RX 250 WO HCPCS: Performed by: UROLOGY

## 2024-12-31 PROCEDURE — 6360000002 HC RX W HCPCS: Performed by: NURSE ANESTHETIST, CERTIFIED REGISTERED

## 2024-12-31 PROCEDURE — 7100000001 HC PACU RECOVERY - ADDTL 15 MIN: Performed by: UROLOGY

## 2024-12-31 PROCEDURE — C1769 GUIDE WIRE: HCPCS | Performed by: UROLOGY

## 2024-12-31 PROCEDURE — C2617 STENT, NON-COR, TEM W/O DEL: HCPCS | Performed by: UROLOGY

## 2024-12-31 DEVICE — URETERAL STENT
Type: IMPLANTABLE DEVICE | Site: URETER | Status: FUNCTIONAL
Brand: CONTOUR™

## 2024-12-31 RX ORDER — NALOXONE HYDROCHLORIDE 0.4 MG/ML
INJECTION, SOLUTION INTRAMUSCULAR; INTRAVENOUS; SUBCUTANEOUS PRN
Status: DISCONTINUED | OUTPATIENT
Start: 2024-12-31 | End: 2024-12-31 | Stop reason: HOSPADM

## 2024-12-31 RX ORDER — OXYCODONE HYDROCHLORIDE 5 MG/1
5 TABLET ORAL PRN
Status: DISCONTINUED | OUTPATIENT
Start: 2024-12-31 | End: 2024-12-31 | Stop reason: HOSPADM

## 2024-12-31 RX ORDER — CEPHALEXIN 500 MG/1
500 CAPSULE ORAL 2 TIMES DAILY
Qty: 14 CAPSULE | Refills: 0 | Status: SHIPPED | OUTPATIENT
Start: 2024-12-31 | End: 2025-01-07

## 2024-12-31 RX ORDER — DEXAMETHASONE SODIUM PHOSPHATE 4 MG/ML
INJECTION, SOLUTION INTRA-ARTICULAR; INTRALESIONAL; INTRAMUSCULAR; INTRAVENOUS; SOFT TISSUE
Status: DISCONTINUED | OUTPATIENT
Start: 2024-12-31 | End: 2024-12-31 | Stop reason: SDUPTHER

## 2024-12-31 RX ORDER — BUDESONIDE AND FORMOTEROL FUMARATE DIHYDRATE 80; 4.5 UG/1; UG/1
2 AEROSOL RESPIRATORY (INHALATION)
Status: DISCONTINUED | OUTPATIENT
Start: 2024-12-31 | End: 2024-12-31 | Stop reason: HOSPADM

## 2024-12-31 RX ORDER — EPHEDRINE SULFATE 50 MG/ML
INJECTION INTRAVENOUS
Status: DISCONTINUED | OUTPATIENT
Start: 2024-12-31 | End: 2024-12-31 | Stop reason: SDUPTHER

## 2024-12-31 RX ORDER — ROSUVASTATIN CALCIUM 10 MG/1
20 TABLET, COATED ORAL NIGHTLY
Status: DISCONTINUED | OUTPATIENT
Start: 2024-12-31 | End: 2024-12-31 | Stop reason: HOSPADM

## 2024-12-31 RX ORDER — DIPHENHYDRAMINE HYDROCHLORIDE 50 MG/ML
12.5 INJECTION INTRAMUSCULAR; INTRAVENOUS
Status: DISCONTINUED | OUTPATIENT
Start: 2024-12-31 | End: 2024-12-31 | Stop reason: HOSPADM

## 2024-12-31 RX ORDER — MEPERIDINE HYDROCHLORIDE 25 MG/ML
12.5 INJECTION INTRAMUSCULAR; INTRAVENOUS; SUBCUTANEOUS EVERY 5 MIN PRN
Status: DISCONTINUED | OUTPATIENT
Start: 2024-12-31 | End: 2024-12-31 | Stop reason: HOSPADM

## 2024-12-31 RX ORDER — CETIRIZINE HYDROCHLORIDE 10 MG/1
10 TABLET ORAL DAILY
Status: DISCONTINUED | OUTPATIENT
Start: 2024-12-31 | End: 2024-12-31 | Stop reason: HOSPADM

## 2024-12-31 RX ORDER — LACTOBACILLUS RHAMNOSUS GG 10B CELL
1 CAPSULE ORAL DAILY
Status: DISCONTINUED | OUTPATIENT
Start: 2024-12-31 | End: 2024-12-31 | Stop reason: HOSPADM

## 2024-12-31 RX ORDER — PROPOFOL 10 MG/ML
INJECTION, EMULSION INTRAVENOUS
Status: DISCONTINUED | OUTPATIENT
Start: 2024-12-31 | End: 2024-12-31 | Stop reason: SDUPTHER

## 2024-12-31 RX ORDER — SODIUM CHLORIDE 0.9 % (FLUSH) 0.9 %
5-40 SYRINGE (ML) INJECTION PRN
Status: DISCONTINUED | OUTPATIENT
Start: 2024-12-31 | End: 2024-12-31 | Stop reason: HOSPADM

## 2024-12-31 RX ORDER — LABETALOL HYDROCHLORIDE 5 MG/ML
10 INJECTION, SOLUTION INTRAVENOUS
Status: DISCONTINUED | OUTPATIENT
Start: 2024-12-31 | End: 2024-12-31 | Stop reason: HOSPADM

## 2024-12-31 RX ORDER — MONTELUKAST SODIUM 10 MG/1
10 TABLET ORAL NIGHTLY
Status: DISCONTINUED | OUTPATIENT
Start: 2024-12-31 | End: 2024-12-31 | Stop reason: HOSPADM

## 2024-12-31 RX ORDER — ONDANSETRON 2 MG/ML
INJECTION INTRAMUSCULAR; INTRAVENOUS
Status: DISCONTINUED | OUTPATIENT
Start: 2024-12-31 | End: 2024-12-31 | Stop reason: SDUPTHER

## 2024-12-31 RX ORDER — GLYCOPYRROLATE 0.2 MG/ML
INJECTION INTRAMUSCULAR; INTRAVENOUS
Status: DISCONTINUED | OUTPATIENT
Start: 2024-12-31 | End: 2024-12-31 | Stop reason: SDUPTHER

## 2024-12-31 RX ORDER — LIDOCAINE HYDROCHLORIDE 20 MG/ML
INJECTION, SOLUTION INFILTRATION; PERINEURAL
Status: DISCONTINUED | OUTPATIENT
Start: 2024-12-31 | End: 2024-12-31 | Stop reason: SDUPTHER

## 2024-12-31 RX ORDER — SODIUM CHLORIDE 0.9 % (FLUSH) 0.9 %
5-40 SYRINGE (ML) INJECTION EVERY 12 HOURS SCHEDULED
Status: DISCONTINUED | OUTPATIENT
Start: 2024-12-31 | End: 2024-12-31 | Stop reason: HOSPADM

## 2024-12-31 RX ORDER — FENTANYL CITRATE 50 UG/ML
INJECTION, SOLUTION INTRAMUSCULAR; INTRAVENOUS
Status: DISCONTINUED | OUTPATIENT
Start: 2024-12-31 | End: 2024-12-31 | Stop reason: SDUPTHER

## 2024-12-31 RX ORDER — OXYCODONE HYDROCHLORIDE 5 MG/1
10 TABLET ORAL PRN
Status: DISCONTINUED | OUTPATIENT
Start: 2024-12-31 | End: 2024-12-31 | Stop reason: HOSPADM

## 2024-12-31 RX ORDER — SODIUM CHLORIDE 9 MG/ML
INJECTION, SOLUTION INTRAVENOUS PRN
Status: DISCONTINUED | OUTPATIENT
Start: 2024-12-31 | End: 2024-12-31 | Stop reason: HOSPADM

## 2024-12-31 RX ORDER — ALBUTEROL SULFATE 90 UG/1
2 INHALANT RESPIRATORY (INHALATION) EVERY 6 HOURS PRN
Status: DISCONTINUED | OUTPATIENT
Start: 2024-12-31 | End: 2024-12-31 | Stop reason: HOSPADM

## 2024-12-31 RX ORDER — ONDANSETRON 2 MG/ML
4 INJECTION INTRAMUSCULAR; INTRAVENOUS
Status: DISCONTINUED | OUTPATIENT
Start: 2024-12-31 | End: 2024-12-31 | Stop reason: HOSPADM

## 2024-12-31 RX ADMIN — FENTANYL CITRATE 100 MCG: 50 INJECTION INTRAMUSCULAR; INTRAVENOUS at 12:19

## 2024-12-31 RX ADMIN — DEXAMETHASONE SODIUM PHOSPHATE 4 MG: 4 INJECTION INTRA-ARTICULAR; INTRALESIONAL; INTRAMUSCULAR; INTRAVENOUS; SOFT TISSUE at 12:22

## 2024-12-31 RX ADMIN — ONDANSETRON 4 MG: 2 INJECTION INTRAMUSCULAR; INTRAVENOUS at 12:22

## 2024-12-31 RX ADMIN — PROPOFOL 150 MG: 10 INJECTION, EMULSION INTRAVENOUS at 12:12

## 2024-12-31 RX ADMIN — SODIUM CHLORIDE 1000 MG: 900 INJECTION INTRAVENOUS at 08:36

## 2024-12-31 RX ADMIN — GLYCOPYRROLATE 0.4 MG: 0.2 INJECTION INTRAMUSCULAR; INTRAVENOUS at 12:28

## 2024-12-31 RX ADMIN — BUDESONIDE AND FORMOTEROL FUMARATE DIHYDRATE 2 PUFF: 80; 4.5 AEROSOL RESPIRATORY (INHALATION) at 08:07

## 2024-12-31 RX ADMIN — LIDOCAINE HYDROCHLORIDE 60 MG: 20 INJECTION, SOLUTION INFILTRATION; PERINEURAL at 12:12

## 2024-12-31 RX ADMIN — EPHEDRINE SULFATE 20 MG: 50 INJECTION INTRAVENOUS at 12:28

## 2024-12-31 ASSESSMENT — PAIN SCALES - GENERAL
PAINLEVEL_OUTOF10: 0
PAINLEVEL_OUTOF10: 0

## 2024-12-31 ASSESSMENT — LIFESTYLE VARIABLES: SMOKING_STATUS: 1

## 2024-12-31 ASSESSMENT — PAIN - FUNCTIONAL ASSESSMENT
PAIN_FUNCTIONAL_ASSESSMENT: 0-10
PAIN_FUNCTIONAL_ASSESSMENT: NONE - DENIES PAIN

## 2024-12-31 ASSESSMENT — ENCOUNTER SYMPTOMS: SHORTNESS OF BREATH: 1

## 2024-12-31 NOTE — FLOWSHEET NOTE
12/31/24 1324   Vital Signs   Temp 98 °F (36.7 °C)   Temp Source Axillary   Pulse 84   Heart Rate Source Monitor   Respirations 20   /89   MAP (Calculated) 101   BP Location Right upper arm   BP Method Automatic   Patient Position Semi fowlers   Pain Assessment   Pain Assessment None - Denies Pain   Oxygen Therapy   SpO2 94 %   O2 Device None (Room air)       Patient arrive from post op. Vss.

## 2024-12-31 NOTE — PROGRESS NOTES
Patient educated on discharge instructions as well as new medications use, dosage, administration and possible side effects.  Patient verified knowledge. IV removed without difficulty and dry dressing in place. Pt left facility in stable condition to Home with all of their personal belongings.

## 2024-12-31 NOTE — ANESTHESIA POSTPROCEDURE EVALUATION
Department of Anesthesiology  Postprocedure Note    Patient: Savi Mays  MRN: [id number]  YOB: 1970  Date of evaluation: 12/31/2024    Procedure Summary       Date: 12/31/24 Room / Location: [address]    Anesthesia Start: 1207 Anesthesia Stop: 1238    Procedure: CYSTOSCOPY, LEFT URETERAL STENT PLACEMENT, right retrograde, pyelogram (Left: Ureter) Diagnosis:       Calculus of ureter      (Calculus of ureter [N20.1])    Surgeons: Latoya Caldwell MD Responsible Provider: Irineo Ferrell MD    Anesthesia Type: MAC ASA Status: 3            Anesthesia Type: No value filed.    Nguyne Phase I: Nguyen Score: 10    Nguyen Phase II:      Anesthesia Post Evaluation    Patient location during evaluation: PACU  Patient participation: complete - patient participated  Level of consciousness: awake and alert  Airway patency: patent  Nausea & Vomiting: no nausea and no vomiting  Cardiovascular status: blood pressure returned to baseline  Respiratory status: acceptable  Hydration status: euvolemic  Comments: VSS on transfer to phase 2 recovery.  No anesthetic complications.  Pain management: adequate    No notable events documented.

## 2024-12-31 NOTE — H&P
ULTRASOUND    VENTRAL HERNIA REPAIR N/A 09/04/2014    REPAIR OF INCARCERATED VENTRAL HERNIA       Medications Prior to Admission:    Prior to Admission medications    Medication Sig Start Date End Date Taking? Authorizing Provider   diclofenac (VOLTAREN) 75 MG EC tablet 2 times daily 11/9/24  Yes Provider, Historical, MD   OZEMPIC, 2 MG/DOSE, 8 MG/3ML SOPN sc injection Inject into the skin 9/11/24  Yes Rosario Bhakta MD   rosuvastatin (CRESTOR) 20 MG tablet  12/7/24  Yes Rosario Bhakta MD   buPROPion (WELLBUTRIN SR) 150 MG extended release tablet Take 1 tablet by mouth 2 times daily 11/24/23   Rosario Bhakta MD   losartan (COZAAR) 50 MG tablet Take 1 tablet by mouth daily 12/12/23   Rosario Bhakta MD   levothyroxine (SYNTHROID) 75 MCG tablet Take 1 tablet by mouth Daily    Rosario Bhakta MD   Cholecalciferol (VITAMIN D) 50 MCG (2000 UT) CAPS capsule Take by mouth    Rosario Bhakta MD   magnesium (MAGNESIUM-OXIDE) 250 MG TABS tablet Take 1 tablet by mouth daily    Rosario Bhakta MD   ferrous sulfate (IRON 325) 325 (65 Fe) MG tablet Take 1 tablet by mouth in the morning and at bedtime    Rosario Bhakta MD   aspirin 81 MG tablet Take 1 tablet by mouth daily  Patient not taking: Reported on 12/30/2024    Rosario Bhakta MD   ONE TOUCH ULTRA TEST strip  4/4/18   Rosario Bhakta MD   blood glucose test strips (ASCENSIA AUTODISC VI;ONE TOUCH ULTRA TEST VI) strip Check Blood glucose  Twice Daily 4/3/18   Rosario Bhakta MD   budesonide-formoterol (SYMBICORT) 80-4.5 MCG/ACT AERO USE 2 INHALATIONS ORALLY   TWICE DAILY 2/27/18   Rosario Bhakta MD   Probiotic Product (PROBIOTIC DAILY PO) Take 1 capsule by mouth daily    Rosario Bhakta MD   Coenzyme Q10 (CO Q-10) 100 MG CAPS Take 1 capsule by mouth daily    Rosario Bhakta MD   albuterol sulfate  (90 Base) MCG/ACT inhaler Inhale 1-2 puffs into the lungs 5/1/17   Livia

## 2024-12-31 NOTE — CARE COORDINATION
to current housing: na    Potential Assistance needed at discharge: N/A            Potential DME:    Patient expects to discharge to: House  Plan for transportation at discharge:      Financial    Payor: [place] BCBS / Plan: [place]BS - [place] PPO / Product Type: *No Product type* /     Does insurance require precert for SNF: Yes    Potential assistance Purchasing Medications: No  Meds-to-Beds request: Yes      [facility] [address] [place], [place] - [address] -  [phone] - F [phone]  [address] [facility] [facility] [address]  Phone: [phone] Fax: [phone]    [id number] [place], [place] - [address] [phone] - F [phone]  [address] [address]  Phone: [phone] Fax: [phone]      Notes:    Factors facilitating achievement of predicted outcomes: Family support, Motivated, and Cooperative    Barriers to discharge: pyelonephritis    Additional Case Management Notes: Reviewed chart. Met with pt at bedside. Role of cm explained. IPTA. Lives with spouse. +. Wears CPAP at night-Pt owns. Likely NN.      The Plan for Transition of Care is related to the following treatment goals of Pyelonephritis [N12]  Obstructive uropathy [N13.9]  TAMANNA (acute kidney injury) (HCC) [N17.9]  Nausea vomiting and diarrhea [R11.2, R19.7]  Hydronephrosis with urinary obstruction due to renal calculus [N13.2]    IF APPLICABLE: The Patient and/or patient representative Savi and her family were provided with a choice of provider and agrees with the discharge plan. Freedom of choice list with basic dialogue that supports the patient's individualized plan of care/goals and shares the quality data associated with the providers was provided to:     Patient Representative Name:       The Patient and/or Patient Representative Agree with the Discharge Plan?      Darwin Gandara RN  Case Management Department  Ph: [phone] Fax: [phone]

## 2024-12-31 NOTE — OP NOTE
DATE OF SURGERY: 12/31/24  SURGEON: Latoya Caldwell MD    PRE OP DIAGNOSIS:   1) Left ureteral stones  2) Left staghorn stone  3) Possible distal right ureteral stone     POST OP DIAGNOSIS: Same    PROCEDURES PERFORMED:  1) Cystoscopy left ureteral stent placement  2) Right retrograde pyelogram    INDICATIONS FOR PROCEDURE: The patient is a 54 y.o. female with a stack of left ureteral stones as well as a large left staghorn stone.  She has a culture proven Proteus urinary tract infection.  Additionally, on her CT scan I question if she could have a distal right ureteral stone versus large phlebolith.  She presents today for definite left ureteral stent placement.  I also counseled her for right retrograde pyelogram with possible right ureteral stent placement should she be found to have a stone on the side.  She wishes to proceed understanding that she will need secondary surgeries to clear her extensive left-sided stone burden.    DESCRIPTION OF PROCEDURE:  After informed consent the patient was taken to the operating room and placed under general anesthesia.  The patient was prepped and draped in the standard surgical fashion in the dorsal lithotomy position. A 21 Fr rigid cystoscope was placed per urethra into the bladder and the bladder was inspected and noted to be free of any suspicious lesions.  I advanced a sensor wire up the left ureteral orifice into the renal pelvis without any difficulty.  The sensor wire was then exchanged for a 5 Citizen of [place] open-ended ureteral catheter and contrast was injected into the collecting system for identification purposes.  I then replaced the sensor wire and converted it to a 6 Citizen of [place] by 24 cm double-J ureteral stent with excellent proximal distal coil.  No string was left in place.    Next, I turned my attention toward the right side.  I cannulated the right ureteral orifice with a prime 5 [place] of [place] open-ended ureteral catheter and injected contrast for retrograde pyelogram.

## 2024-12-31 NOTE — DISCHARGE SUMMARY
See Combined H & P and Discharge Summary       Medication List        START taking these medications      cephALEXin 500 MG capsule  Commonly known as: KEFLEX  Take 1 capsule by mouth 2 times daily for 7 days            CHANGE how you take these medications      rosuvastatin 20 MG tablet  Commonly known as: CRESTOR  What changed: Another medication with the same name was removed. Continue taking this medication, and follow the directions you see here.            CONTINUE taking these medications      albuterol sulfate  (90 Base) MCG/ACT inhaler  Commonly known as: PROVENTIL;VENTOLIN;PROAIR     budesonide-formoterol 80-4.5 MCG/ACT Aero  Commonly known as: SYMBICORT     buPROPion 150 MG extended release tablet  Commonly known as: WELLBUTRIN SR     Co Q-10 100 MG Caps     diclofenac 75 MG EC tablet  Commonly known as: VOLTAREN     ferrous sulfate 325 (65 Fe) MG tablet  Commonly known as: IRON 325     fish oil 1000 MG capsule     GLUCOSAMINE CHONDR 500 COMPLEX PO     levocetirizine 5 MG tablet  Commonly known as: XYZAL     levothyroxine 75 MCG tablet  Commonly known as: SYNTHROID     losartan 50 MG tablet  Commonly known as: COZAAR     magnesium 250 MG Tabs tablet  Commonly known as: MAGNESIUM-OXIDE     metFORMIN 1000 MG tablet  Commonly known as: GLUCOPHAGE     montelukast 10 MG tablet  Commonly known as: SINGULAIR     * blood glucose test strips strip  Commonly known as: ASCENSIA AUTODISC VI;ONE TOUCH ULTRA TEST VI     * ONE TOUCH ULTRA TEST strip  Generic drug: blood glucose test strips     Ozempic (2 MG/DOSE) 8 MG/3ML Sopn sc injection  Generic drug: semaglutide (2 MG/DOSE)     PROBIOTIC DAILY PO     vitamin B-6 100 MG tablet  Commonly known as: PYRIDOXINE     vitamin C 250 MG tablet     vitamin D 50 MCG (2000 UT) Caps capsule  Commonly known as: CHOLECALCIFEROL           * This list has 2 medication(s) that are the same as other medications prescribed for you. Read the directions carefully, and ask your

## 2024-12-31 NOTE — PROGRESS NOTES
Perla at bedside for report on the pt. Pt is a/o in stable condition and ready to transfer back to the floor

## 2024-12-31 NOTE — CONSULTS
Component Value Date/Time     12/31/2024 05:33 AM    K 3.7 12/31/2024 05:33 AM    CL 98 12/31/2024 05:33 AM    CO2 27 12/31/2024 05:33 AM    BUN 31 12/31/2024 05:33 AM    CREATININE 1.0 12/31/2024 05:33 AM    GLUCOSE 105 12/31/2024 05:33 AM    CALCIUM 8.1 12/31/2024 05:33 AM       Urinalysis:   Lab Results   Component Value Date/Time    COLORU Yellow 12/30/2024 01:46 PM    GLUCOSEU Negative 12/30/2024 01:46 PM    BLOODU LARGE 12/30/2024 01:46 PM    NITRU Negative 12/30/2024 01:46 PM    LEUKOCYTESUR LARGE 12/30/2024 01:46 PM       Imaging:    CT abdomen/pelvis 12/30/24  IMPRESSION:  5 mm left mid ureteral obstructing stone.  Left staghorn calculus.  Nonobstructing right renal stone.     Anterior abdominal wall hernias which contain large and small bowel and  mesenteric fat but no evidence of obstruction or incarceration.       Impression/Plan:   - 54y.o. female with 3 mid left ureteral stones, large left staghorn stone. UTI.  - Urine culture positive proteus mirabilis - sensitivity pending, continue antibiotics and tailor per sensitivity.  - Cystoscopy with left ureteral stent insertion today with Dr. Caldwell. Procedure, benefits, risks including ureteral stent side effects of urgency, frequency, dysuria and gross hematuria were all discussed and agreed to proceed.   - Definitive stone treatment in about 2 weeks.     ROBBI Kat - CNP 12/31/202410:03 AM

## 2024-12-31 NOTE — PLAN OF CARE
Problem: Pain  Goal: Verbalizes/displays adequate comfort level or baseline comfort level  12/31/2024 1123 by Ellen Truong RN  Flowsheets (Taken 12/31/2024 1123)  Verbalizes/displays adequate comfort level or baseline comfort level: Assess pain using appropriate pain scale  12/31/2024 1122 by Ellen Truong, RN  Outcome: Progressing  12/30/2024 2312 by Ayaz Conley RN  Outcome: Progressing

## 2024-12-31 NOTE — ACP (ADVANCE CARE PLANNING)
Advance Care Planning     General Advance Care Planning (ACP) Conversation    Date of Conversation: 12/31/2024  Conducted with: Patient with Decision Making Capacity  Other persons present: None    Healthcare Decision Maker: No healthcare decision makers have been documented.       Content/Action Overview:  DECLINED ACP Conversation - will revisit periodically  Reviewed DNR/DNI and patient elects Full Code (Attempt Resuscitation)        Length of Voluntary ACP Conversation in minutes:  <16 minutes (Non-Billable)    Darwin Gandara RN

## 2024-12-31 NOTE — ANESTHESIA PRE PROCEDURE
Date/Time     12/31/2024 05:33 AM    K 3.7 12/31/2024 05:33 AM    CL 98 12/31/2024 05:33 AM    CO2 27 12/31/2024 05:33 AM    BUN 31 12/31/2024 05:33 AM    CREATININE 1.0 12/31/2024 05:33 AM    GFRAA >60 12/26/2020 11:00 AM    GFRAA >60 01/21/2013 08:17 AM    AGRATIO 1.1 12/30/2024 01:46 PM    LABGLOM 66 12/31/2024 05:33 AM    GLUCOSE 105 12/31/2024 05:33 AM    CALCIUM 8.1 12/31/2024 05:33 AM    BILITOT 0.8 12/30/2024 01:46 PM    ALKPHOS 100 12/30/2024 01:46 PM    AST 29 12/30/2024 01:46 PM    ALT 22 12/30/2024 01:46 PM       POC Tests:   Recent Labs     12/31/24  0737   POCGLU 97       Coags:   Lab Results   Component Value Date/Time    PROTIME 13.1 05/29/2020 08:18 AM    INR 1.13 05/29/2020 08:18 AM    APTT 32.7 08/24/2018 01:45 PM       HCG (If Applicable): No results found for: \"PREGTESTUR\", \"PREGSERUM\", \"HCG\", \"HCGQUANT\"     ABGs: No results found for: \"PHART\", \"PO2ART\", \"GIV2ROQ\", \"DGN3YXS\", \"BEART\", \"R3MQYTAT\"     Type & Screen (If Applicable):  No results found for: \"ABORH\", \"LABANTI\"    Drug/Infectious Status (If Applicable):  No results found for: \"HIV\", \"HEPCAB\"    COVID-19 Screening (If Applicable):   Lab Results   Component Value Date/Time    COVID19 NOT DETECTED 12/30/2024 01:58 PM           Anesthesia Evaluation    Airway: Mallampati: II  TM distance: >3 FB   Neck ROM: full  Mouth opening: > = 3 FB   Dental:    (+) upper dentures      Pulmonary:   (+)   shortness of breath:   sleep apnea: on CPAP,       asthma: current smoker                           Cardiovascular:    (+) hypertension:, CAD:, pulmonary hypertension:, hyperlipidemia                  Neuro/Psych:   (+) neuromuscular disease:            GI/Hepatic/Renal:   (+) GERD:, renal disease: kidney stones          Endo/Other:    (+) DiabetesType II DM.                 Abdominal:             Vascular: negative vascular ROS.         Other Findings:       Anesthesia Plan      MAC     ASA 3     (Risks, benefits and alternatives of MAC

## 2024-12-31 NOTE — PROGRESS NOTES
Shift report given to Ellen at bedside. Patient is stable. All end of shift needs have been met. No further assistance needed at this time.

## 2024-12-31 NOTE — PROGRESS NOTES
Blood pressure 102/73, pulse 65, temperature 97.5 °F (36.4 °C), temperature source Oral, resp. rate 20, height 1.651 m (5' 5\"), weight 99.7 kg (219 lb 14.4 oz), SpO2 91%.    Shift assessment completed see flow sheet. Patient in bed alert and oriented x4. Patient on RA, showing no signs of distress. Po meds held per npo order. Patient has no other needs at this time. Call light in reach

## 2024-12-31 NOTE — PROGRESS NOTES
4 Eyes Skin Assessment     NAME:  Savi Mays  YOB: 1970  MEDICAL RECORD NUMBER:  [id number]    The patient is being assessed for  Admission    I agree that at least one RN has performed a thorough Head to Toe Skin Assessment on the patient. ALL assessment sites listed below have been assessed.      Areas assessed by both nurses:    Head, Face, Ears, Shoulders, Back, Chest, Arms, Elbows, Hands, and Legs. Feet and Heels        Does the Patient have a Wound? No noted wound(s)       Kumar Prevention initiated by RN: No  Wound Care Orders initiated by RN: No    Pressure Injury (Stage 3,4, Unstageable, DTI, NWPT, and Complex wounds) if present, place Wound referral order by RN under : No    New Ostomies, if present place, Ostomy referral order under : No     Nurse 1 eSignature: Electronically signed by Ayaz Conley RN on 12/30/24 at 11:14 PM EST    **SHARE this note so that the co-signing nurse can place an eSignature**    Nurse 2 eSignature: Electronically signed by SOCORRO CABRERA RN on 12/31/24 at 4:29 AM EST

## 2024-12-31 NOTE — PLAN OF CARE
Problem: Pain  Goal: Verbalizes/displays adequate comfort level or baseline comfort level  12/31/2024 1122 by Ellen Truong, RN  Outcome: Progressing  12/30/2024 2312 by Ayaz Conley, RN  Outcome: Progressing

## 2025-01-01 LAB
BACTERIA UR CULT: ABNORMAL
EST. AVERAGE GLUCOSE BLD GHB EST-MCNC: 111.2 MG/DL
HBA1C MFR BLD: 5.5 %
ORGANISM: ABNORMAL

## 2025-01-04 LAB
BACTERIA BLD CULT ORG #2: NORMAL
BACTERIA BLD CULT: NORMAL

## 2025-01-31 ENCOUNTER — TELEPHONE (OUTPATIENT)
Dept: INTERVENTIONAL RADIOLOGY/VASCULAR | Age: 55
End: 2025-01-31

## 2025-01-31 NOTE — TELEPHONE ENCOUNTER
Attempted to contact patient at listed number [phone], to schedule for PCNU placement. No answer, left VM with call back number.

## 2025-02-24 ENCOUNTER — HOSPITAL ENCOUNTER (OUTPATIENT)
Age: 55
Discharge: HOME | End: 2025-02-24
Payer: COMMERCIAL

## 2025-02-24 ENCOUNTER — ANESTHESIA EVENT (OUTPATIENT)
Dept: OPERATING ROOM | Age: 55
End: 2025-02-24
Payer: COMMERCIAL

## 2025-02-24 ENCOUNTER — HOSPITAL ENCOUNTER (OUTPATIENT)
Dept: INTERVENTIONAL RADIOLOGY/VASCULAR | Age: 55
Discharge: HOME | End: 2025-02-24
Payer: COMMERCIAL

## 2025-02-24 VITALS
SYSTOLIC BLOOD PRESSURE: 128 MMHG | OXYGEN SATURATION: 97 % | HEART RATE: 76 BPM | WEIGHT: 218 LBS | RESPIRATION RATE: 16 BRPM | TEMPERATURE: 98.1 F | HEIGHT: 65 IN | DIASTOLIC BLOOD PRESSURE: 81 MMHG | BODY MASS INDEX: 36.32 KG/M2

## 2025-02-24 DIAGNOSIS — Z87.440 HISTORY OF URINARY TRACT INFECTION: ICD-10-CM

## 2025-02-24 LAB
DEPRECATED RDW RBC AUTO: 14.8 % (ref 12.4–15.4)
HCT VFR BLD AUTO: 44.3 % (ref 36–48)
HGB BLD-MCNC: 15.2 G/DL (ref 12–16)
INR PPP: 0.95 (ref 0.85–1.15)
MCH RBC QN AUTO: 32.7 PG (ref 26–34)
MCHC RBC AUTO-ENTMCNC: 34.3 G/DL (ref 31–36)
MCV RBC AUTO: 95.3 FL (ref 80–100)
PLATELET # BLD AUTO: 282 K/UL (ref 135–450)
PMV BLD AUTO: 8.1 FL (ref 5–10.5)
PROTHROMBIN TIME: 12.9 SEC (ref 11.9–14.9)
RBC # BLD AUTO: 4.65 M/UL (ref 4–5.2)
WBC # BLD AUTO: 8.2 K/UL (ref 4–11)

## 2025-02-24 PROCEDURE — 50433 PLMT NEPHROURETERAL CATHETER: CPT

## 2025-02-24 PROCEDURE — 2580000003 HC RX 258: Performed by: STUDENT IN AN ORGANIZED HEALTH CARE EDUCATION/TRAINING PROGRAM

## 2025-02-24 PROCEDURE — 6360000004 HC RX CONTRAST MEDICATION: Performed by: UROLOGY

## 2025-02-24 PROCEDURE — 85027 COMPLETE CBC AUTOMATED: CPT

## 2025-02-24 PROCEDURE — 6360000002 HC RX W HCPCS: Performed by: STUDENT IN AN ORGANIZED HEALTH CARE EDUCATION/TRAINING PROGRAM

## 2025-02-24 PROCEDURE — 99152 MOD SED SAME PHYS/QHP 5/>YRS: CPT

## 2025-02-24 PROCEDURE — 36415 COLL VENOUS BLD VENIPUNCTURE: CPT

## 2025-02-24 PROCEDURE — C2625 STENT, NON-COR, TEM W/DEL SY: HCPCS

## 2025-02-24 PROCEDURE — 85610 PROTHROMBIN TIME: CPT

## 2025-02-24 RX ORDER — HYDROCODONE BITARTRATE AND ACETAMINOPHEN 5; 325 MG/1; MG/1
1 TABLET ORAL EVERY 4 HOURS PRN
Status: DISCONTINUED | OUTPATIENT
Start: 2025-02-24 | End: 2025-02-25 | Stop reason: HOSPADM

## 2025-02-24 RX ORDER — FENTANYL CITRATE 50 UG/ML
INJECTION, SOLUTION INTRAMUSCULAR; INTRAVENOUS PRN
Status: COMPLETED | OUTPATIENT
Start: 2025-02-24 | End: 2025-02-24

## 2025-02-24 RX ORDER — SODIUM CHLORIDE 9 MG/ML
INJECTION, SOLUTION INTRAVENOUS PRN
Status: DISCONTINUED | OUTPATIENT
Start: 2025-02-24 | End: 2025-02-25 | Stop reason: HOSPADM

## 2025-02-24 RX ORDER — SODIUM CHLORIDE 0.9 % (FLUSH) 0.9 %
5-40 SYRINGE (ML) INJECTION EVERY 12 HOURS SCHEDULED
Status: DISCONTINUED | OUTPATIENT
Start: 2025-02-24 | End: 2025-02-25 | Stop reason: HOSPADM

## 2025-02-24 RX ORDER — MIDAZOLAM HYDROCHLORIDE 5 MG/ML
INJECTION, SOLUTION INTRAMUSCULAR; INTRAVENOUS PRN
Status: COMPLETED | OUTPATIENT
Start: 2025-02-24 | End: 2025-02-24

## 2025-02-24 RX ORDER — ONDANSETRON 2 MG/ML
4 INJECTION INTRAMUSCULAR; INTRAVENOUS EVERY 8 HOURS PRN
Status: DISCONTINUED | OUTPATIENT
Start: 2025-02-24 | End: 2025-02-25 | Stop reason: HOSPADM

## 2025-02-24 RX ORDER — SODIUM CHLORIDE 0.9 % (FLUSH) 0.9 %
5-40 SYRINGE (ML) INJECTION PRN
Status: DISCONTINUED | OUTPATIENT
Start: 2025-02-24 | End: 2025-02-25 | Stop reason: HOSPADM

## 2025-02-24 RX ORDER — HYDROCODONE BITARTRATE AND ACETAMINOPHEN 5; 325 MG/1; MG/1
2 TABLET ORAL EVERY 4 HOURS PRN
Status: DISCONTINUED | OUTPATIENT
Start: 2025-02-24 | End: 2025-02-25 | Stop reason: HOSPADM

## 2025-02-24 RX ORDER — ASPIRIN 81 MG/1
81 TABLET ORAL DAILY
COMMUNITY

## 2025-02-24 RX ADMIN — MIDAZOLAM HYDROCHLORIDE 0.5 MG: 5 INJECTION, SOLUTION INTRAMUSCULAR; INTRAVENOUS at 09:03

## 2025-02-24 RX ADMIN — FENTANYL CITRATE 50 MCG: 50 INJECTION INTRAMUSCULAR; INTRAVENOUS at 08:54

## 2025-02-24 RX ADMIN — MIDAZOLAM HYDROCHLORIDE 1 MG: 5 INJECTION, SOLUTION INTRAMUSCULAR; INTRAVENOUS at 08:55

## 2025-02-24 RX ADMIN — IOHEXOL 40 ML: 240 INJECTION, SOLUTION INTRATHECAL; INTRAVASCULAR; INTRAVENOUS; ORAL at 09:08

## 2025-02-24 RX ADMIN — FENTANYL CITRATE 25 MCG: 50 INJECTION INTRAMUSCULAR; INTRAVENOUS at 09:03

## 2025-02-24 RX ADMIN — CEFTRIAXONE SODIUM 2000 MG: 1 INJECTION, POWDER, FOR SOLUTION INTRAMUSCULAR; INTRAVENOUS at 08:56

## 2025-02-24 ASSESSMENT — PAIN - FUNCTIONAL ASSESSMENT: PAIN_FUNCTIONAL_ASSESSMENT: NONE - DENIES PAIN

## 2025-02-24 ASSESSMENT — ENCOUNTER SYMPTOMS: SHORTNESS OF BREATH: 1

## 2025-02-24 NOTE — ANESTHESIA PRE PROCEDURE
Department of Anesthesiology  Preprocedure Note       Name:  Savi Mays   Age:  54 y.o.  :  1970                                          MRN:  [id number]         Date:  2025      Surgeon: Surgeon(s):  Cedrick Hebert MD    Procedure: Procedure(s):  LEFT PERCUTANEOUS NEPHROLITHOTOMY  (PCNL TUBE PLACED ON 25)    Medications prior to admission:   Prior to Admission medications    Medication Sig Start Date End Date Taking? Authorizing Provider   Krill Oil 300 MG CAPS Take 1 capsule by mouth daily   Yes Rosario Bhakta MD   aspirin 81 MG EC tablet Take 1 tablet by mouth daily    Rosario Bhakta MD   diclofenac (VOLTAREN) 75 MG EC tablet 2 times daily 24   Rosario Bhakta MD   OZEMPIC, 2 MG/DOSE, 8 MG/3ML SOPN sc injection Inject into the skin 24   Rosario Bhakta MD   rosuvastatin (CRESTOR) 20 MG tablet  24   Rosario Bhakta MD   buPROPion (WELLBUTRIN SR) 150 MG extended release tablet Take 1 tablet by mouth 2 times daily 23   Rosario Bhakta MD   losartan (COZAAR) 50 MG tablet Take 1 tablet by mouth daily 23   Rosario Bhakta MD   levothyroxine (SYNTHROID) 75 MCG tablet Take 1 tablet by mouth Daily    Rosario Bhakta MD   Cholecalciferol (VITAMIN D) 50 MCG (2000 UT) CAPS capsule Take by mouth    Rosario Bhakta MD   magnesium (MAGNESIUM-OXIDE) 250 MG TABS tablet Take 1 tablet by mouth daily    Rosario Bhakta MD   ferrous sulfate (IRON 325) 325 (65 Fe) MG tablet Take 1 tablet by mouth in the morning and at bedtime    Rosario Bhakta MD   ONE TOUCH ULTRA TEST strip  18   Rosario Bhakta MD   blood glucose test strips (ASCENSIA AUTODISC VI;ONE TOUCH ULTRA TEST VI) strip Check Blood glucose  Twice Daily 4/3/18   Rosario Bhakta MD   budesonide-formoterol (SYMBICORT) 80-4.5 MCG/ACT AERO USE 2 INHALATIONS ORALLY   TWICE DAILY 18   Rosario Bhakta MD   Probiotic Product (PROBIOTIC

## 2025-02-24 NOTE — PRE SEDATION
Sedation Pre-Procedure Note    Patient Name: Savi Mays   YOB: 1970  Room/Bed: Room/bed info not found  Medical Record Number: [id number]  Date: 2/24/2025   Time: 8:33 AM       Indication:  Left nephrolithiasis, PCNU placement requested.     Consent: I have discussed with the patient and/or the patient representative the indication, alternatives, and the possible risks and/or complications of the planned procedure and the anesthesia methods. The patient and/or patient representative appear to understand and agree to proceed.    Vital Signs:   Vitals:    02/24/25 0733   BP: (!) 133/97   Pulse: 84   Resp: 20   Temp: 97.1 °F (36.2 °C)   SpO2: 94%       Past Medical History:   has a past medical history of Acid reflux, Asthma, Diabetes mellitus (HCC), Hernia of abdominal cavity, Hyperlipidemia, Hypertension, and KORY treated with Cpap.    Past Surgical History:   has a past surgical history that includes Hysterectomy; hernia repair; Cholecystectomy; other surgical history (Left); ventral hernia repair (N/A, 09/04/2014); Arm Surgery; Shoulder arthroscopy (Left, 10/03/2018); pr surgical arthroscopy shoulder w/rotator cuff rpr (Left, 10/03/2018); US BIOPSY THYROID (08/28/2020); Thyroidectomy, partial (Right, 10/23/2020); Cardiac catheterization (05/2020); Ovary removal; and Cystoscopy (Left, 12/31/2024).    Medications:   Scheduled Meds:    sodium chloride flush  5-40 mL IntraVENous 2 times per day     Continuous Infusions:    sodium chloride       PRN Meds: sodium chloride flush, sodium chloride  Home Meds:   Prior to Admission medications    Medication Sig Start Date End Date Taking? Authorizing Provider   aspirin 81 MG EC tablet Take 1 tablet by mouth daily   Yes Rosario Bhakta MD   rosuvastatin (CRESTOR) 20 MG tablet  12/7/24  Yes Rosario Bhakta MD   buPROPion (WELLBUTRIN SR) 150 MG extended release tablet Take 1 tablet by mouth 2 times daily 11/24/23  Yes Rosario Bhakta MD

## 2025-02-24 NOTE — OR NURSING
Image guided nephroureteral tube insertion left kidney completed. Dr. Nicholson placed 8.5 Slov[place] Seligman nephroureterostomy stent LOT # [id number] EXP 01/10/2028 in the left kidney. Drain/tube secured with sutures. Yellow red colored withdrawn immediately.  Drain/tube dressing clean, dry, and intact. Pt tolerated procedure without any signs or symptoms of distress. Vital signs stable. Report called to IR RN. Pt transported back to IR in stable condition via bed by transport.     Medications  Versed: 1.5 mg  Fentanyl: 75 mcg    Vital Signs  Vitals:    02/24/25 0903   BP: (!) 136/96   Pulse: 74   Resp: 18   Temp:    SpO2: 97%    (vital signs in table format)    Post Nguyen  2 - Able to move 4 extremities voluntarily on command  2 - BP+/- 20mmHg of normal  2 - Fully awake  2 - Able to maintain oxygen saturation >92% on room air  2 - Able to breathe deeply and cough freely    This RN wasted the following with JOSH Hurt Rn.  0.5 mg Versed  25 mcg Fentanyl

## 2025-02-24 NOTE — BRIEF OP NOTE
Interventional Radiology  Brief Postoperative Note    Patient: Savi Mays  YOB: 1970  MRN: [id number]      Pre-operative Diagnosis: Left nephrolithiasis    Post-operative Diagnosis: Same    Procedure: Left PCNU placement    Anesthesia: Local and Moderate Sedation    Surgeons/Assistants: Benjy Nicholson DO    Estimated Blood Loss: less than 50     Complications: None    Infection Present At Time Of Surgery (PATOS) (choose all levels that have infection present):  No infection present    Specimens: Was Not Obtained    Findings:   Left staghorn calculus.   Successful placement of a 8.5-F 22 cm PCNU via a lower pole calyx, placed to gravity drainage.       Benjy Nicholson DO  2/24/2025, 9:12 AM  Interventional Radiology  [phone] ([phone])

## 2025-02-24 NOTE — PROGRESS NOTES
Pt dressed, IV pulled, d/c instructions given, pt verbalized understanding. Pt declined w/c to leave, pt ambulated to diagnostic lobby with stand by assist where family was waiting. Family escorted pt from lobby.

## 2025-02-24 NOTE — OR NURSING
Pt arrived for image guided PCNU insertion left kidney . Procedure explained including the risk and benefits of the procedure. All questions answered. Pt verbalizes understanding of the procedure and states no more questions. Consent confirmed. Vital signs stable. Labs, allergies, medications, and code status reviewed. No contraindications noted.     Vital Signs  Vitals:    02/24/25 0733   BP: (!) 133/97   Pulse: 84   Resp: 20   Temp: 97.1 °F (36.2 °C)   SpO2: 94%    (vital signs in table format)    Pre Nguyen Score  2 - Able to move 4 extremities voluntarily on command  2 - BP+/- 20mmHg of normal  2 - Fully awake  2 - Able to maintain oxygen saturation >92% on room air  2 - Able to breathe deeply and cough freely    Allergies  Ace inhibitors and Atorvastatin (allergies)    Labs  Lab Results   Component Value Date    INR 0.95 02/24/2025    PROTIME 12.9 02/24/2025     Lab Results   Component Value Date    CREATININE 1.0 12/31/2024    BUN 31 (H) 12/31/2024     (L) 12/31/2024    K 3.7 12/31/2024    CL 98 (L) 12/31/2024    CO2 27 12/31/2024     Lab Results   Component Value Date    WBC 8.2 02/24/2025    HGB 15.2 02/24/2025    HCT 44.3 02/24/2025    MCV 95.3 02/24/2025     02/24/2025

## 2025-02-25 ENCOUNTER — ANESTHESIA (OUTPATIENT)
Dept: OPERATING ROOM | Age: 55
End: 2025-02-25
Payer: COMMERCIAL

## 2025-02-25 ENCOUNTER — HOSPITAL ENCOUNTER (OUTPATIENT)
Age: 55
Setting detail: OBSERVATION
Discharge: HOME | End: 2025-02-25
Attending: UROLOGY | Admitting: UROLOGY
Payer: COMMERCIAL

## 2025-02-25 ENCOUNTER — APPOINTMENT (OUTPATIENT)
Dept: GENERAL RADIOLOGY | Age: 55
End: 2025-02-25
Attending: UROLOGY
Payer: COMMERCIAL

## 2025-02-25 VITALS
SYSTOLIC BLOOD PRESSURE: 138 MMHG | HEIGHT: 65 IN | TEMPERATURE: 97.9 F | OXYGEN SATURATION: 97 % | BODY MASS INDEX: 36.49 KG/M2 | RESPIRATION RATE: 16 BRPM | WEIGHT: 219 LBS | HEART RATE: 87 BPM | DIASTOLIC BLOOD PRESSURE: 86 MMHG

## 2025-02-25 DIAGNOSIS — N20.0 RENAL CALCULUS: ICD-10-CM

## 2025-02-25 LAB
ANION GAP SERPL CALCULATED.3IONS-SCNC: 11 MMOL/L (ref 3–16)
BACTERIA URNS QL MICRO: ABNORMAL /HPF
BILIRUB UR QL STRIP.AUTO: NEGATIVE
BUN SERPL-MCNC: 13 MG/DL (ref 7–20)
CALCIUM SERPL-MCNC: 8.6 MG/DL (ref 8.3–10.6)
CHLORIDE SERPL-SCNC: 107 MMOL/L (ref 99–110)
CLARITY UR: ABNORMAL
CO2 SERPL-SCNC: 23 MMOL/L (ref 21–32)
COLOR UR: YELLOW
CREAT SERPL-MCNC: 0.9 MG/DL (ref 0.6–1.1)
DEPRECATED RDW RBC AUTO: 14.8 % (ref 12.4–15.4)
EPI CELLS #/AREA URNS HPF: ABNORMAL /HPF (ref 0–5)
FLUAV RNA RESP QL NAA+PROBE: NOT DETECTED
FLUBV RNA RESP QL NAA+PROBE: NOT DETECTED
GFR SERPLBLD CREATININE-BSD FMLA CKD-EPI: 75 ML/MIN/{1.73_M2}
GLUCOSE BLD-MCNC: 126 MG/DL (ref 70–99)
GLUCOSE BLD-MCNC: 128 MG/DL (ref 70–99)
GLUCOSE SERPL-MCNC: 133 MG/DL (ref 70–99)
GLUCOSE UR STRIP.AUTO-MCNC: NEGATIVE MG/DL
HCT VFR BLD AUTO: 43.7 % (ref 36–48)
HCT VFR BLD AUTO: 44.2 % (ref 36–48)
HGB BLD-MCNC: 14.6 G/DL (ref 12–16)
HGB BLD-MCNC: 14.7 G/DL (ref 12–16)
HGB UR QL STRIP.AUTO: ABNORMAL
KETONES UR STRIP.AUTO-MCNC: NEGATIVE MG/DL
LEUKOCYTE ESTERASE UR QL STRIP.AUTO: ABNORMAL
MCH RBC QN AUTO: 32 PG (ref 26–34)
MCHC RBC AUTO-ENTMCNC: 33.7 G/DL (ref 31–36)
MCV RBC AUTO: 95 FL (ref 80–100)
NITRITE UR QL STRIP.AUTO: NEGATIVE
PERFORMED ON: ABNORMAL
PERFORMED ON: ABNORMAL
PH UR STRIP.AUTO: 6.5 [PH] (ref 5–8)
PLATELET # BLD AUTO: 256 K/UL (ref 135–450)
PMV BLD AUTO: 7.4 FL (ref 5–10.5)
POTASSIUM SERPL-SCNC: 4.2 MMOL/L (ref 3.5–5.1)
PROT UR STRIP.AUTO-MCNC: 100 MG/DL
RBC # BLD AUTO: 4.6 M/UL (ref 4–5.2)
RBC #/AREA URNS HPF: >100 /HPF (ref 0–4)
RSV BY PCR: NOT DETECTED
SARS-COV-2 RNA RESP QL NAA+PROBE: NOT DETECTED
SODIUM SERPL-SCNC: 141 MMOL/L (ref 136–145)
SP GR UR STRIP.AUTO: 1.02 (ref 1–1.03)
UA DIPSTICK W REFLEX MICRO PNL UR: YES
URN SPEC COLLECT METH UR: ABNORMAL
UROBILINOGEN UR STRIP-ACNC: 0.2 E.U./DL
WBC # BLD AUTO: 11 K/UL (ref 4–11)
WBC #/AREA URNS HPF: ABNORMAL /HPF (ref 0–5)

## 2025-02-25 PROCEDURE — 81001 URINALYSIS AUTO W/SCOPE: CPT

## 2025-02-25 PROCEDURE — 74420 UROGRAPHY RTRGR +-KUB: CPT

## 2025-02-25 PROCEDURE — C1726 CATH, BAL DIL, NON-VASCULAR: HCPCS | Performed by: UROLOGY

## 2025-02-25 PROCEDURE — 85014 HEMATOCRIT: CPT

## 2025-02-25 PROCEDURE — 7100000011 HC PHASE II RECOVERY - ADDTL 15 MIN: Performed by: UROLOGY

## 2025-02-25 PROCEDURE — C1894 INTRO/SHEATH, NON-LASER: HCPCS | Performed by: UROLOGY

## 2025-02-25 PROCEDURE — C1729 CATH, DRAINAGE: HCPCS | Performed by: UROLOGY

## 2025-02-25 PROCEDURE — 7100000000 HC PACU RECOVERY - FIRST 15 MIN: Performed by: UROLOGY

## 2025-02-25 PROCEDURE — 3600000014 HC SURGERY LEVEL 4 ADDTL 15MIN: Performed by: UROLOGY

## 2025-02-25 PROCEDURE — 87086 URINE CULTURE/COLONY COUNT: CPT

## 2025-02-25 PROCEDURE — 6360000004 HC RX CONTRAST MEDICATION: Performed by: UROLOGY

## 2025-02-25 PROCEDURE — 36415 COLL VENOUS BLD VENIPUNCTURE: CPT

## 2025-02-25 PROCEDURE — 2500000003 HC RX 250 WO HCPCS: Performed by: UROLOGY

## 2025-02-25 PROCEDURE — C1769 GUIDE WIRE: HCPCS | Performed by: UROLOGY

## 2025-02-25 PROCEDURE — 2709999900 HC NON-CHARGEABLE SUPPLY: Performed by: UROLOGY

## 2025-02-25 PROCEDURE — 7100000010 HC PHASE II RECOVERY - FIRST 15 MIN: Performed by: UROLOGY

## 2025-02-25 PROCEDURE — 3700000001 HC ADD 15 MINUTES (ANESTHESIA): Performed by: UROLOGY

## 2025-02-25 PROCEDURE — 88300 SURGICAL PATH GROSS: CPT

## 2025-02-25 PROCEDURE — 3700000000 HC ANESTHESIA ATTENDED CARE: Performed by: UROLOGY

## 2025-02-25 PROCEDURE — 85018 HEMOGLOBIN: CPT

## 2025-02-25 PROCEDURE — 6360000002 HC RX W HCPCS: Performed by: UROLOGY

## 2025-02-25 PROCEDURE — 82365 CALCULUS SPECTROSCOPY: CPT

## 2025-02-25 PROCEDURE — 7100000001 HC PACU RECOVERY - ADDTL 15 MIN: Performed by: UROLOGY

## 2025-02-25 PROCEDURE — 6360000002 HC RX W HCPCS

## 2025-02-25 PROCEDURE — 85027 COMPLETE CBC AUTOMATED: CPT

## 2025-02-25 PROCEDURE — 2500000003 HC RX 250 WO HCPCS

## 2025-02-25 PROCEDURE — 2720000010 HC SURG SUPPLY STERILE: Performed by: UROLOGY

## 2025-02-25 PROCEDURE — 2580000003 HC RX 258: Performed by: ANESTHESIOLOGY

## 2025-02-25 PROCEDURE — 80048 BASIC METABOLIC PNL TOTAL CA: CPT

## 2025-02-25 PROCEDURE — 2580000003 HC RX 258: Performed by: UROLOGY

## 2025-02-25 PROCEDURE — 3600000004 HC SURGERY LEVEL 4 BASE: Performed by: UROLOGY

## 2025-02-25 RX ORDER — SENNA AND DOCUSATE SODIUM 50; 8.6 MG/1; MG/1
1 TABLET, FILM COATED ORAL 2 TIMES DAILY
Status: CANCELLED | OUTPATIENT
Start: 2025-02-25

## 2025-02-25 RX ORDER — SODIUM CHLORIDE, SODIUM LACTATE, POTASSIUM CHLORIDE, CALCIUM CHLORIDE 600; 310; 30; 20 MG/100ML; MG/100ML; MG/100ML; MG/100ML
INJECTION, SOLUTION INTRAVENOUS CONTINUOUS
Status: DISCONTINUED | OUTPATIENT
Start: 2025-02-25 | End: 2025-02-25 | Stop reason: HOSPADM

## 2025-02-25 RX ORDER — POLYETHYLENE GLYCOL 3350 17 G/17G
17 POWDER, FOR SOLUTION ORAL DAILY PRN
Status: CANCELLED | OUTPATIENT
Start: 2025-02-25

## 2025-02-25 RX ORDER — OXYCODONE HYDROCHLORIDE 5 MG/1
5 TABLET ORAL EVERY 4 HOURS PRN
Status: CANCELLED | OUTPATIENT
Start: 2025-02-25

## 2025-02-25 RX ORDER — POTASSIUM CHLORIDE 1500 MG/1
40 TABLET, EXTENDED RELEASE ORAL PRN
Status: CANCELLED | OUTPATIENT
Start: 2025-02-25

## 2025-02-25 RX ORDER — MULTIVITAMIN WITH IRON
250 TABLET ORAL DAILY
Status: CANCELLED | OUTPATIENT
Start: 2025-02-25

## 2025-02-25 RX ORDER — FLUCONAZOLE 2 MG/ML
200 INJECTION, SOLUTION INTRAVENOUS EVERY 24 HOURS
Status: DISCONTINUED | OUTPATIENT
Start: 2025-02-25 | End: 2025-02-25 | Stop reason: HOSPADM

## 2025-02-25 RX ORDER — PROPOFOL 10 MG/ML
INJECTION, EMULSION INTRAVENOUS
Status: DISCONTINUED | OUTPATIENT
Start: 2025-02-25 | End: 2025-02-25 | Stop reason: SDUPTHER

## 2025-02-25 RX ORDER — SODIUM CHLORIDE 0.9 % (FLUSH) 0.9 %
5-40 SYRINGE (ML) INJECTION EVERY 12 HOURS SCHEDULED
Status: CANCELLED | OUTPATIENT
Start: 2025-02-25

## 2025-02-25 RX ORDER — ONDANSETRON 4 MG/1
4 TABLET, ORALLY DISINTEGRATING ORAL EVERY 8 HOURS PRN
Status: CANCELLED | OUTPATIENT
Start: 2025-02-25

## 2025-02-25 RX ORDER — MIDAZOLAM HYDROCHLORIDE 1 MG/ML
INJECTION, SOLUTION INTRAMUSCULAR; INTRAVENOUS
Status: DISCONTINUED | OUTPATIENT
Start: 2025-02-25 | End: 2025-02-25 | Stop reason: SDUPTHER

## 2025-02-25 RX ORDER — NALOXONE HYDROCHLORIDE 0.4 MG/ML
INJECTION, SOLUTION INTRAMUSCULAR; INTRAVENOUS; SUBCUTANEOUS PRN
Status: DISCONTINUED | OUTPATIENT
Start: 2025-02-25 | End: 2025-02-25 | Stop reason: HOSPADM

## 2025-02-25 RX ORDER — LIDOCAINE HYDROCHLORIDE 20 MG/ML
INJECTION, SOLUTION INFILTRATION; PERINEURAL
Status: DISCONTINUED | OUTPATIENT
Start: 2025-02-25 | End: 2025-02-25 | Stop reason: SDUPTHER

## 2025-02-25 RX ORDER — FLUCONAZOLE 150 MG/1
150 TABLET ORAL ONCE
Qty: 1 TABLET | Refills: 0 | Status: SHIPPED | OUTPATIENT
Start: 2025-02-25 | End: 2025-02-25

## 2025-02-25 RX ORDER — DEXAMETHASONE SODIUM PHOSPHATE 4 MG/ML
INJECTION, SOLUTION INTRA-ARTICULAR; INTRALESIONAL; INTRAMUSCULAR; INTRAVENOUS; SOFT TISSUE
Status: DISCONTINUED | OUTPATIENT
Start: 2025-02-25 | End: 2025-02-25 | Stop reason: SDUPTHER

## 2025-02-25 RX ORDER — DIPHENHYDRAMINE HYDROCHLORIDE 50 MG/ML
25 INJECTION INTRAMUSCULAR; INTRAVENOUS EVERY 6 HOURS PRN
Status: CANCELLED | OUTPATIENT
Start: 2025-02-25

## 2025-02-25 RX ORDER — BUDESONIDE AND FORMOTEROL FUMARATE DIHYDRATE 80; 4.5 UG/1; UG/1
1 AEROSOL RESPIRATORY (INHALATION)
Status: CANCELLED | OUTPATIENT
Start: 2025-02-25

## 2025-02-25 RX ORDER — BUPROPION HYDROCHLORIDE 150 MG/1
150 TABLET, EXTENDED RELEASE ORAL 2 TIMES DAILY
Status: CANCELLED | OUTPATIENT
Start: 2025-02-25

## 2025-02-25 RX ORDER — ROCURONIUM BROMIDE 10 MG/ML
INJECTION, SOLUTION INTRAVENOUS
Status: DISCONTINUED | OUTPATIENT
Start: 2025-02-25 | End: 2025-02-25 | Stop reason: SDUPTHER

## 2025-02-25 RX ORDER — OXYCODONE HYDROCHLORIDE 5 MG/1
5 TABLET ORAL PRN
Status: DISCONTINUED | OUTPATIENT
Start: 2025-02-25 | End: 2025-02-25 | Stop reason: HOSPADM

## 2025-02-25 RX ORDER — MEPERIDINE HYDROCHLORIDE 25 MG/ML
12.5 INJECTION INTRAMUSCULAR; INTRAVENOUS; SUBCUTANEOUS EVERY 5 MIN PRN
Status: DISCONTINUED | OUTPATIENT
Start: 2025-02-25 | End: 2025-02-25 | Stop reason: HOSPADM

## 2025-02-25 RX ORDER — LANOLIN ALCOHOL/MO/W.PET/CERES
200 CREAM (GRAM) TOPICAL DAILY
Status: CANCELLED | OUTPATIENT
Start: 2025-02-25

## 2025-02-25 RX ORDER — DIPHENHYDRAMINE HYDROCHLORIDE 50 MG/ML
12.5 INJECTION INTRAMUSCULAR; INTRAVENOUS
Status: DISCONTINUED | OUTPATIENT
Start: 2025-02-25 | End: 2025-02-25 | Stop reason: HOSPADM

## 2025-02-25 RX ORDER — SODIUM CHLORIDE 0.9 % (FLUSH) 0.9 %
5-40 SYRINGE (ML) INJECTION PRN
Status: DISCONTINUED | OUTPATIENT
Start: 2025-02-25 | End: 2025-02-25 | Stop reason: HOSPADM

## 2025-02-25 RX ORDER — LIDOCAINE HYDROCHLORIDE 10 MG/ML
0.3 INJECTION, SOLUTION EPIDURAL; INFILTRATION; INTRACAUDAL; PERINEURAL
Status: DISCONTINUED | OUTPATIENT
Start: 2025-02-25 | End: 2025-02-25 | Stop reason: HOSPADM

## 2025-02-25 RX ORDER — LOSARTAN POTASSIUM 50 MG/1
50 TABLET ORAL DAILY
Status: CANCELLED | OUTPATIENT
Start: 2025-02-25

## 2025-02-25 RX ORDER — SODIUM CHLORIDE 0.9 % (FLUSH) 0.9 %
5-40 SYRINGE (ML) INJECTION EVERY 12 HOURS SCHEDULED
Status: DISCONTINUED | OUTPATIENT
Start: 2025-02-25 | End: 2025-02-25 | Stop reason: HOSPADM

## 2025-02-25 RX ORDER — SODIUM CHLORIDE 9 MG/ML
INJECTION, SOLUTION INTRAVENOUS PRN
Status: DISCONTINUED | OUTPATIENT
Start: 2025-02-25 | End: 2025-02-25 | Stop reason: HOSPADM

## 2025-02-25 RX ORDER — OXYCODONE HYDROCHLORIDE 5 MG/1
10 TABLET ORAL EVERY 4 HOURS PRN
Status: CANCELLED | OUTPATIENT
Start: 2025-02-25

## 2025-02-25 RX ORDER — FERROUS SULFATE 325(65) MG
325 TABLET ORAL 2 TIMES DAILY
Status: CANCELLED | OUTPATIENT
Start: 2025-02-25

## 2025-02-25 RX ORDER — ONDANSETRON 2 MG/ML
INJECTION INTRAMUSCULAR; INTRAVENOUS
Status: DISCONTINUED | OUTPATIENT
Start: 2025-02-25 | End: 2025-02-25 | Stop reason: SDUPTHER

## 2025-02-25 RX ORDER — PHENYLEPHRINE HYDROCHLORIDE 10 MG/ML
INJECTION INTRAVENOUS
Status: DISCONTINUED | OUTPATIENT
Start: 2025-02-25 | End: 2025-02-25 | Stop reason: SDUPTHER

## 2025-02-25 RX ORDER — KETOROLAC TROMETHAMINE 30 MG/ML
15 INJECTION, SOLUTION INTRAMUSCULAR; INTRAVENOUS EVERY 6 HOURS PRN
Status: CANCELLED | OUTPATIENT
Start: 2025-02-25 | End: 2025-03-02

## 2025-02-25 RX ORDER — ONDANSETRON 2 MG/ML
4 INJECTION INTRAMUSCULAR; INTRAVENOUS EVERY 6 HOURS PRN
Status: CANCELLED | OUTPATIENT
Start: 2025-02-25

## 2025-02-25 RX ORDER — POTASSIUM CHLORIDE 7.45 MG/ML
10 INJECTION INTRAVENOUS PRN
Status: CANCELLED | OUTPATIENT
Start: 2025-02-25

## 2025-02-25 RX ORDER — ALBUTEROL SULFATE 90 UG/1
1 INHALANT RESPIRATORY (INHALATION) EVERY 6 HOURS PRN
Status: CANCELLED | OUTPATIENT
Start: 2025-02-25

## 2025-02-25 RX ORDER — MONTELUKAST SODIUM 10 MG/1
10 TABLET ORAL NIGHTLY
Status: CANCELLED | OUTPATIENT
Start: 2025-02-25

## 2025-02-25 RX ORDER — FENTANYL CITRATE 50 UG/ML
INJECTION, SOLUTION INTRAMUSCULAR; INTRAVENOUS
Status: DISCONTINUED | OUTPATIENT
Start: 2025-02-25 | End: 2025-02-25 | Stop reason: SDUPTHER

## 2025-02-25 RX ORDER — BUPIVACAINE HYDROCHLORIDE 5 MG/ML
INJECTION, SOLUTION EPIDURAL; INTRACAUDAL PRN
Status: DISCONTINUED | OUTPATIENT
Start: 2025-02-25 | End: 2025-02-25 | Stop reason: ALTCHOICE

## 2025-02-25 RX ORDER — SODIUM CHLORIDE 0.9 % (FLUSH) 0.9 %
10 SYRINGE (ML) INJECTION PRN
Status: CANCELLED | OUTPATIENT
Start: 2025-02-25

## 2025-02-25 RX ORDER — SODIUM CHLORIDE 9 MG/ML
INJECTION, SOLUTION INTRAVENOUS CONTINUOUS
Status: CANCELLED | OUTPATIENT
Start: 2025-02-25

## 2025-02-25 RX ORDER — ENOXAPARIN SODIUM 100 MG/ML
40 INJECTION SUBCUTANEOUS DAILY
Status: CANCELLED | OUTPATIENT
Start: 2025-02-25

## 2025-02-25 RX ORDER — ACETAMINOPHEN 325 MG/1
650 TABLET ORAL EVERY 6 HOURS
Status: CANCELLED | OUTPATIENT
Start: 2025-02-25

## 2025-02-25 RX ORDER — OXYCODONE HYDROCHLORIDE 5 MG/1
10 TABLET ORAL PRN
Status: DISCONTINUED | OUTPATIENT
Start: 2025-02-25 | End: 2025-02-25 | Stop reason: HOSPADM

## 2025-02-25 RX ORDER — LABETALOL HYDROCHLORIDE 5 MG/ML
5 INJECTION, SOLUTION INTRAVENOUS EVERY 10 MIN PRN
Status: DISCONTINUED | OUTPATIENT
Start: 2025-02-25 | End: 2025-02-25 | Stop reason: HOSPADM

## 2025-02-25 RX ORDER — OXYCODONE AND ACETAMINOPHEN 5; 325 MG/1; MG/1
1 TABLET ORAL EVERY 8 HOURS PRN
Qty: 9 TABLET | Refills: 0 | Status: CANCELLED | OUTPATIENT
Start: 2025-02-25 | End: 2025-02-28

## 2025-02-25 RX ORDER — SODIUM CHLORIDE 9 MG/ML
INJECTION, SOLUTION INTRAVENOUS PRN
Status: CANCELLED | OUTPATIENT
Start: 2025-02-25

## 2025-02-25 RX ORDER — MAGNESIUM HYDROXIDE 1200 MG/15ML
LIQUID ORAL CONTINUOUS PRN
Status: COMPLETED | OUTPATIENT
Start: 2025-02-25 | End: 2025-02-25

## 2025-02-25 RX ORDER — CETIRIZINE HYDROCHLORIDE 10 MG/1
10 TABLET ORAL NIGHTLY
Status: CANCELLED | OUTPATIENT
Start: 2025-02-25

## 2025-02-25 RX ORDER — ONDANSETRON 2 MG/ML
4 INJECTION INTRAMUSCULAR; INTRAVENOUS
Status: DISCONTINUED | OUTPATIENT
Start: 2025-02-25 | End: 2025-02-25 | Stop reason: HOSPADM

## 2025-02-25 RX ORDER — MAGNESIUM SULFATE IN WATER 40 MG/ML
2000 INJECTION, SOLUTION INTRAVENOUS PRN
Status: CANCELLED | OUTPATIENT
Start: 2025-02-25

## 2025-02-25 RX ADMIN — LIDOCAINE HYDROCHLORIDE 60 MG: 20 INJECTION, SOLUTION INFILTRATION; PERINEURAL at 07:52

## 2025-02-25 RX ADMIN — FENTANYL CITRATE 50 MCG: 50 INJECTION INTRAMUSCULAR; INTRAVENOUS at 07:52

## 2025-02-25 RX ADMIN — SUGAMMADEX 200 MG: 100 INJECTION, SOLUTION INTRAVENOUS at 09:43

## 2025-02-25 RX ADMIN — ONDANSETRON 4 MG: 2 INJECTION INTRAMUSCULAR; INTRAVENOUS at 09:29

## 2025-02-25 RX ADMIN — SODIUM CHLORIDE, POTASSIUM CHLORIDE, SODIUM LACTATE AND CALCIUM CHLORIDE: 600; 310; 30; 20 INJECTION, SOLUTION INTRAVENOUS at 07:50

## 2025-02-25 RX ADMIN — MIDAZOLAM 2 MG: 1 INJECTION INTRAMUSCULAR; INTRAVENOUS at 07:48

## 2025-02-25 RX ADMIN — ROCURONIUM BROMIDE 10 MG: 10 INJECTION, SOLUTION INTRAVENOUS at 08:27

## 2025-02-25 RX ADMIN — ROCURONIUM BROMIDE 10 MG: 10 INJECTION, SOLUTION INTRAVENOUS at 09:15

## 2025-02-25 RX ADMIN — SODIUM CHLORIDE 2000 MG: 900 INJECTION INTRAVENOUS at 07:50

## 2025-02-25 RX ADMIN — FLUCONAZOLE 200 MG: 2 INJECTION, SOLUTION INTRAVENOUS at 08:07

## 2025-02-25 RX ADMIN — FENTANYL CITRATE 50 MCG: 50 INJECTION INTRAMUSCULAR; INTRAVENOUS at 07:48

## 2025-02-25 RX ADMIN — PROPOFOL 150 MG: 10 INJECTION, EMULSION INTRAVENOUS at 07:52

## 2025-02-25 RX ADMIN — ROCURONIUM BROMIDE 50 MG: 10 INJECTION, SOLUTION INTRAVENOUS at 07:53

## 2025-02-25 RX ADMIN — DEXAMETHASONE SODIUM PHOSPHATE 4 MG: 4 INJECTION INTRA-ARTICULAR; INTRALESIONAL; INTRAMUSCULAR; INTRAVENOUS; SOFT TISSUE at 08:17

## 2025-02-25 RX ADMIN — PHENYLEPHRINE HYDROCHLORIDE 40 MCG: 10 INJECTION INTRAVENOUS at 09:08

## 2025-02-25 RX ADMIN — FENTANYL CITRATE 50 MCG: 50 INJECTION INTRAMUSCULAR; INTRAVENOUS at 08:49

## 2025-02-25 RX ADMIN — FENTANYL CITRATE 50 MCG: 50 INJECTION INTRAMUSCULAR; INTRAVENOUS at 09:35

## 2025-02-25 ASSESSMENT — PAIN - FUNCTIONAL ASSESSMENT
PAIN_FUNCTIONAL_ASSESSMENT: NONE - DENIES PAIN
PAIN_FUNCTIONAL_ASSESSMENT: 0-10

## 2025-02-25 NOTE — OP NOTE
Operative Note      Patient: Savi Mays  YOB: 1970  MRN: [id number]    Date of Procedure: 2/25/2025    Pre-Op Diagnosis Codes:      * Renal calculus [N20.0]; left staghorn calculi, more than 4cm,  retained foreign body - ureteral stent     POSTOPERATIVE DIAGNOSE: same     OPERATION PERFORMED:  1.  Left flexible Ureteroscopy and left JJ ureteral stent removal  (separate procedure modifier)  2.  Left percutaneous nephrolithotomy, staghorn stone, total stone more than 4 cm, Complicated modifier taking 50% longer than is typical due to the large branching stone   3.  Balloon dilation of nephrostomy tract.  4.  Nephrostomy tube exchange, 10F drainage tube  5.  Antegrade nephrostogram     SURGEON:  Cedrick Hebert MD.     ANESTHESIA:  General with an ET tube.        INDICATIONS:  The patient is a 54 year-old who was found to have a  large kidney stone left side, stage I, s/p left stent, she will follow-up PCNU tube placement.  She is clear yellow urine.  No fevers she is on antibiotics based on previous cultures. The risks, benefits, and alternatives of treatment were explained to the patient (see hospital, office notes).  The patient signed the consent prior to surgery.     FINDINGS:    -all visible stone cleared.  -JJ stent pulled and ureter clear on antegrade ureteroscopy  -antegrade nephrostogram showed 10-Australian nephrostomy in place in renal pelvis.  No extravasation of contrast        OPERATIVE PROCEDURE:  The patient was brought to the OR suite,  Anesthesia was induced.  Next, we deployed a 16-F orantes catheter and the patient was repositioned prone.    We took spot fluoroscopic images to confirm stone in the kidney and percutaneous nephroureteral (PCN) tube seen going down the ureter.  Then antegrade nephrogram was done to outline anatomy of the kidney and then I guided a Super Stiff wire down the ureter and backed up PCN tube. We then made a stab incision was made at flank site.   I then

## 2025-02-25 NOTE — H&P
H&P reviewed. The patient was examined and there are no changes to the H&P.     Hp from hospital notes, office notes, or printed at bedside.  See all documents including the scanned Documents.   These were reviewed with the patient and I examined the patient.  There was no change.  The surgical site was confirmed by the patient and me.     Vitals:    02/25/25 0651   BP: 135/88   Pulse: 91   Resp: 17   Temp: 97.7 °F (36.5 °C)   SpO2: 98%           Plan: The risks, benefits, expected outcome, and alternative to the recommended procedure have been discussed with the patient. Patient understands and wants to proceed with the procedure.     All questions answered.     Side marked

## 2025-02-25 NOTE — ANESTHESIA POSTPROCEDURE EVALUATION
Department of Anesthesiology  Postprocedure Note    Patient: Savi Mays  MRN: [id number]  YOB: 1970  Date of evaluation: 2/25/2025    Procedure Summary       Date: 02/25/25 Room / Location: [address]    Anesthesia Start: 0750 Anesthesia Stop: 0952    Procedure: LEFT PERCUTANEOUS NEPHROLITHOTOMY (Left: Flank) Diagnosis:       Renal calculus      (Renal calculus [N20.0])    Surgeons: Cedrick Hebert MD Responsible Provider: Vince Fernando MD    Anesthesia Type: general ASA Status: 3            Anesthesia Type: No value filed.    Nguyen Phase I: Nguyen Score: 8    Nguyen Phase II: Nguyen Score: 10    Anesthesia Post Evaluation    Comments: Postoperative Anesthesia Note    Name:    Savi Mays  MRN:      [id number]    Patient Vitals in the past 12 hrs:  02/25/25 1410, BP:138/86, Temp:97.9 °F (36.6 °C), Temp src:Oral, Pulse:87, Resp:16, SpO2:97 %  02/25/25 1300, BP:(!) 142/85, Temp:97.9 °F (36.6 °C), Temp src:Oral, Pulse:88, Resp:16, SpO2:96 %  02/25/25 1215, BP:(!) 146/87, Temp:97.9 °F (36.6 °C), Temp src:Oral, Pulse:88, Resp:16, SpO2:97 %  02/25/25 1045, BP:(!) 148/88, Temp:97.8 °F (36.6 °C), Temp src:Oral, Pulse:91, Resp:16, SpO2:96 %  02/25/25 1015, BP:(!) 145/89, Pulse:90, Resp:16, SpO2:92 %  02/25/25 1005, BP:(!) 142/80, Temp:97.7 °F (36.5 °C), Temp src:Oral, Pulse:91, Resp:16  02/25/25 0948, BP:(!) 146/90, Temp:97.7 °F (36.5 °C), Temp src:Oral, Pulse:90, Resp:16  02/25/25 0651, BP:135/88, Temp:97.7 °F (36.5 °C), Temp src:Oral, Pulse:91, Resp:17, SpO2:98 %, Height:1.651 m (5' 5\"), Weight:99.3 kg (219 lb)     LABS:    CBC  Lab Results       Component                Value               Date/Time                  WBC                      11.0                02/25/2025 06:56 AM        HGB                      14.6                02/25/2025 11:47 AM        HCT                      44.2                02/25/2025 11:47 AM        PLT                      256

## 2025-02-25 NOTE — PROGRESS NOTES
PRE OP INSTRUCTION SHEET   1. Do not eat or drink anything after 12 midnight  prior to surgery. This includes no water, chewing gum or mints.   2. Take the following pills will a small sip of water (see MAR)                                        3. Aspirin, Ibuprofen, Advil, Naproxen, Vitamin E, fish oil and other Anti-inflammatory products should be stopped for 5 days before surgery or as directed by your physician.   4. Check with your Doctor regarding stopping Plavix, Coumadin, Lovenox, Fragmin or other blood thinners   5. Do not smoke, and do not drink any alcoholic beverages 24 hours prior to surgery.  This includes NA Beer.   6. You may brush your teeth and gargle the morning of surgery.  DO NOT SWALLOW WATER   7. You MUST make arrangements for a responsible adult to take you home after your surgery. You will not be allowed to leave alone or drive yourself home.  It is strongly suggested someone stay with you the first 24 hrs. Your surgery will be cancelled if you do not have a ride home.   8. A parent/legal guardian must accompany a child scheduled for surgery and plan to stay at the hospital until the child is discharged.  Please do not bring other children with you.   9. Please wear simple, loose fitting clothing to the hospital.  Do not bring valuables (money, credit cards, checkbooks, etc.) Do not wear any makeup (including no eye makeup) or nail polish on your fingers or toes.   10. DO NOT wear any jewelry or piercings on day of surgery.  All body piercing jewelry must be removed.   11. If you have dentures,glasses, or contacts they will be removed before going to the OR; we will provide you a container.    12. Please see your family doctor/and cardiologist for a history & physical and/or concerning medications.  Bring any test results/reports from your physician's office. Have history and labs faxed to [phone]    13. Remember to bring Blood Bank bracelet on the day of 
Discharge instructions given to patient and patients  and mother.  All deny any questions at this time.Atiya Yeager RN    
Family at bedside.Atiya Yeager RN    
Granddaughter asking about rash on patients hand and wrist. Anaesthesiologist notified and confirms site looks like petechiae. Patient denies itching or pain.  No change in site since patient has been out of surgery.Atiya Yeager RN    
IV d/c'd intact without redness or edema noted.Atiya Yeaegr RN    
Left hand and wrist showing some petechiae. Will continue to monitor.Atiya Yeager RN    
Patient admitted from OR to PACU.  Bedside report received.  Patient immediately hooked up to heart monitor.Atiya Yeager RN    
Patient discharge via wheelchair in stable condition with all belongings to private car.Atiya Yeager RN    
and Phase II process.  23.  Patient pain level is established preoperatively using age appropriate pain scale.  24.  The patient will move to fall risk upon sedation- during and through the recovery phase.  Interventions- orient the patient to the environment, especially the location of the bathroom; provide treaded socks/non-skid footwear; demonstrate and teach back use of the nurse's call system; instruct the patient to call for help before getting out of bed; lock all movable equipment before transferring patient; keep bed in lowest position possible. 25.  Other:

## 2025-02-25 NOTE — BRIEF OP NOTE
Brief Postoperative Note      Patient: Savi Mays  YOB: 1970  MRN: [id number]    Date of Procedure: 2/25/2025    Pre-Op Diagnosis Codes:      * Renal calculus [N20.0]    Post-Op Diagnosis: Same       Procedure(s):  LEFT PERCUTANEOUS NEPHROLITHOTOMY  (PCNL TUBE PLACED ON 2/24/25)  Complicated modifier taking 50% longer than his typical to the large branching stone  Balloon balloon dilation nephrostomy tract  Removal of double-J ureteral stent  Diagnostic left ureteroscopy  Nephrostomy tube placement  See detailed dictated operative notes for more procedural details    Surgeon(s):  Cedrick Hebert MD    Assistant:  Surgical Assistant: Ezekiel Hernadez    Anesthesia: General    Estimated Blood Loss (mL): Minimal    Complications: None    Specimens:   ID Type Source Tests Collected by Time Destination   A : Kidney Stone Tissue Tissue SURGICAL PATHOLOGY Cedrick Hebert MD 2/25/2025 0933        Implants:  * No implants in log *      Drains:   Ureteral Drain/Stent 02/24/25 Left Ureter (Active)       Urinary Catheter 02/25/25 2 Way (Active)   Catheter Indications Perioperative use for selected surgical procedures 02/25/25 0948   Urine Color Bloody 02/25/25 0948   Urine Appearance Clear 02/25/25 0948   Collection Container Standard 02/25/25 0948   Securement Method Securing device (Describe) 02/25/25 0948   Catheter Best Practices  Drainage tube clipped to bed;Bag not on floor;Lack of dependent loop in tubing;Catheter secured to thigh;Tamper seal intact;Bag below bladder;Drainage bag less than half full 02/25/25 0948   Status Draining 02/25/25 0948       Findings:  Infection Present At Time Of Surgery (PATOS) (choose all levels that have infection present):  No infection present  Other Findings:     See detailed dictated operative notes for more procedural details    leave nephrostomy tube until Thurs/Friday  Check h/h  Ureter clear  Large staghorn but 95-99%   removed, on my

## 2025-02-25 NOTE — CONSULTS
Patient:  Savi Mays  YOB: 1970   CSN:  [id number]    Referring Provider:  No ref. provider found   Primary Care Provider:  Noreen Silva, APRN - [facility]       Urology Attending Consult Note     Reason for Consultation: staghorn calculi    HPI:  Savi is a 54 y.o. female who presented with history of very large kidney stone.  Now with nephrostomy tube in place.     Past Medical History:   Diagnosis Date    Acid reflux     Asthma     Diabetes mellitus (HCC)     Hernia of abdominal cavity     Hyperlipidemia     Hypertension     KORY treated with Cpap        Past Surgical History:   Procedure Laterality Date    ARM SURGERY      L arm    CARDIAC CATHETERIZATION  05/2020    CHOLECYSTECTOMY      CYSTOSCOPY Left 12/31/2024    CYSTOSCOPY, LEFT URETERAL STENT PLACEMENT, right retrograde, pyelogram performed by Latoya Caldwell MD at [facility] OR    HERNIA REPAIR      HYSTERECTOMY (CERVIX STATUS UNKNOWN)      IR GUIDED NEPHROURETERAL CATH PLACEMENT NEW ACCESS  2/24/2025    IR GUIDED NEPHROURETERAL CATH PLACEMENT NEW ACCESS 2/24/2025 [facility] SPECIAL PROCEDURES    OTHER SURGICAL HISTORY Left     surgery on left elbow    OVARY REMOVAL      MO SURGICAL ARTHROSCOPY SHOULDER W/ROTATOR CUFF RPR Left 10/03/2018    SHOULDER ARTHROSCOPY, ROTATOR CUFF REPAIR, SUBACROMIAL DECOMPRESSION performed by Leti Gallardo DO at [facility] OR    SHOULDER ARTHROSCOPY Left 10/03/2018    ROTATOR CUFF REPAIR, ROTATION MEDICAL GRAFT, SUBACROMIAL DECOMPRESSION     THYROIDECTOMY, PARTIAL Right 10/23/2020    US BIOPSY THYROID  08/28/2020    US THYROID BIOPSY 8/28/2020 [facility] ULTRASOUND    VENTRAL HERNIA REPAIR N/A 09/04/2014    REPAIR OF INCARCERATED VENTRAL HERNIA       Medication List reviewed:      Current Facility-Administered Medications   Medication Dose Route Frequency Provider Last Rate Last Admin    ceFAZolin (ANCEF) 2,000 mg in sodium chloride 0.9 % 50 mL IVPB (mini-bag)  2,000 mg IntraVENous Once Cedrick Hebert MD

## 2025-02-25 NOTE — DISCHARGE INSTRUCTIONS
Stone was pulverized and 95 to 99% completely extracted.  Keep flank incision covered with gauze.   You can change it if needed.  We expect it will get wet and fluid/urine make leak on the gauze and leak around the tube onto your back.  The fluid in the bag may be red.     Light walking is encouraged.     Okay to shower just don't soak flank incision.  No heavy lifting for 2 weeks.    Plan to remove your nephrostomy tube at 10:00am on Thursday by Brandi Chapa, my nurse.  Then follow up kidney ultrasound in 4-5 weeks    If you have any questions or issues arise  Please call my Nurse Brandi Chapa - [phone]       Cedrick Hebert MD  Office: [phone]        Percutanous stone removal and Cystoscopy  The doctor will put a thin, lighted tool called a cystoscope, or scope, into your urethra. The urethra is the tube that carries urine from the bladder to the outside of the body. Then the doctor will gently thread the scope into your bladder. Your bladder will then be filled with fluid. This stretches the bladder so that your doctor can clearly see the inside of your bladder.     You may feel the need to urinate often for a while after the surgery. But this should improve with time. It may burn when you urinate. Drink lots of fluids to help with the burning. Your urine also may look pink for up to 2 to 3 weeks after surgery. This is because there may be blood in it.    You may have to avoid strenuous activity and heavy lifting for about 1-2 weeks after surgery.     ANESTHESIA DISCHARGE INSTRUCTIONS    You are under the influence of drugs- do not drink alcohol, drive a car, operate machinery(such as power tools, kitchen appliances, etc), sign legal documents, or make any important decisions for 24 hours (or while on pain medications).   Children should not ride bikes or skate boards or play on gym sets  for 24 hours after surgery.  A responsible adult should be with you for 24 hours.  Rest at home today- increase

## 2025-02-26 LAB — BACTERIA UR CULT: NORMAL

## 2025-02-27 LAB
APPEARANCE STONE: NORMAL
COMPN STONE: NORMAL
SPECIMEN WT: 325 MG

## 2025-03-17 ENCOUNTER — TELEPHONE (OUTPATIENT)
Dept: PULMONOLOGY | Age: 55
End: 2025-03-17

## 2025-03-17 NOTE — TELEPHONE ENCOUNTER
Pt called stating she needs to reschedule her appointment that she has on 03/20/25. She said that she would call back later today to reschedule the appointment as she was getting ready to go into a client's house.

## 2025-06-26 NOTE — PROGRESS NOTES
Patient ID: Savi Mays is a 53 y.o. female who is being seen today for   Chief Complaint   Patient presents with    Follow-up     Sleep,   Pt complains the machine has been making a loud noise for about 6 monhs         HPI:     Savi Mays is a 53 y.o. female in office for KORY follow up.  States she needs a new BIPAP, states it is making a lot of noise and sometimes takes it off due to this.  When turning BIPAP on in office it is making a lot of noise, whirring. Patient is using BiPAP   5-6 hrs/night. Using humidifier. No snoring on BiPAP. The pressure is well tolerated. The mask is comfortable- nasal pillows. No mask leak. No significant daytime sleepiness. No nodding off when driving. No dry nose or throat. No fatigue. Bedtime is MN and rise time is 6-630 am. Sleep onset is few-60 minutes. Wakes up 2-3 times at night total. 2-3 nocturia. It takes few-15 minutes to fall back a sleep. Occasional naps during the day- 10 min. No headache in am. No weight gain. 36-44 oz caffienated beverages during the day. No alcohol. ESS is 9              1/4/2024    10:01 AM 1/5/2023    10:01 AM 12/13/2021     3:19 PM 12/7/2020     9:58 AM 12/9/2019    10:04 AM 12/4/2018     2:47 PM 12/4/2018     2:46 PM   Sleep Medicine   Sitting and reading 3 3 1 1 2 1 1   Watching TV 2 3 0 1 0 2 2   Sitting, inactive in a public place (e.g. a theatre or a meeting) 0 0 0 0 0 1 1   As a passenger in a car for an hour without a break 0 0 0 0 0 0 0   Lying down to rest in the afternoon when circumstances permit 2 3 2 0 3 3 3   Sitting and talking to someone 0 0 0 0 0 1 1   Sitting quietly after a lunch without alcohol 2 0 0 0 0 3 3   In a car, while stopped for a few minutes in traffic 0 0 0 0 0 0 0   [place] Sleepiness Score 9 9 3 2 5 11 11   Neck circumference (Inches) 17.5 16 18.5  18.5  18.5       Past Medical History:  Past Medical History:   Diagnosis Date    Acid reflux     Diabetes mellitus (HCC)     Hyperlipidemia     
General Sunscreen Counseling: I recommended a broad spectrum sunscreen with a SPF of 30 or higher.  I explained that SPF 30 sunscreens block approximately 97 percent of the sun's harmful rays.  Sunscreens should be applied at least 15 minutes prior to expected sun exposure and then every 2 hours after that as long as sun exposure continues. If swimming or exercising sunscreen should be reapplied every 45 minutes to an hour after getting wet or sweating.  One ounce, or the equivalent of a shot glass full of sunscreen, is adequate to protect the skin not covered by a bathing suit. I also recommended a lip balm with a sunscreen as well. Sun protective clothing can be used in lieu of sunscreen but must be worn the entire time you are exposed to the sun's rays.
Products Recommended: SPF 30+
Detail Level: Detailed

## 2025-08-11 ENCOUNTER — TELEPHONE (OUTPATIENT)
Dept: PULMONOLOGY | Age: 55
End: 2025-08-11

## (undated) DEVICE — SOLUTION IRRIGATION STRL H2O 1000 ML UROMATIC CONTAINER

## (undated) DEVICE — GUIDEWIRE ENDOSCP L150CM DIA0.038IN NIT HYDRPHLC STR TIP

## (undated) DEVICE — SUTURE VCRL + SZ 0 L27IN ABSRB UD L36MM CT-1 1/2 CIR VCPP41D

## (undated) DEVICE — GOWN SIRUS NONREIN XL W/TWL: Brand: MEDLINE INDUSTRIES, INC.

## (undated) DEVICE — KIT PRB L440MM DIA3.9MM BLU SWISS LITHOCLAST TRIL

## (undated) DEVICE — PERC NCIRCLE, NITINOL TIPLESS STONE EXTRACTOR: Brand: PERC NCIRCLE

## (undated) DEVICE — GLOVE ORANGE PI 7   MSG9070

## (undated) DEVICE — CRADLE POS PRONE 24 X 5 X 3 IN ARM N COMPR NO CVR FOAM DISP

## (undated) DEVICE — DRAPE,NEPHROSCOPY,STERILE: Brand: MEDLINE

## (undated) DEVICE — CANNULA NSL 13FT TUBE AD ETCO2 DIV SAMP M

## (undated) DEVICE — SUTURE PERMA-HAND SZ 2-0 L30IN NONABSORBABLE BLK L26MM SH K833H

## (undated) DEVICE — COOK-COPE LOOP NEPHROSTOMY CATHETER: Brand: COOK-COPE LOOP

## (undated) DEVICE — SUTURE MCRYL + SZ 4-0 L18IN ABSRB UD L19MM PS-2 3/8 CIR MCP496G

## (undated) DEVICE — SHOULDER STABILIZATION KIT,                                    DISPOSABLE 12 PER BOX

## (undated) DEVICE — DRAPE,REIN 53X77,STERILE: Brand: MEDLINE

## (undated) DEVICE — CHLORAPREP 26ML ORANGE

## (undated) DEVICE — SOLUTION IRRIG 2000ML 0.9% SOD CHL USP UROMATIC PLAS CONT

## (undated) DEVICE — BLADE SAW SAG OSCIL LNG MED 9X31MM

## (undated) DEVICE — SYRINGE MED 10ML LUERLOCK TIP W/O SFTY DISP

## (undated) DEVICE — NITINOL STONE RETRIEVAL BASKET: Brand: ZERO TIP

## (undated) DEVICE — Device

## (undated) DEVICE — 20 ML SYRINGE LUER-LOCK TIP: Brand: MONOJECT

## (undated) DEVICE — GUIDEWIRE ENDOSCP L150CM DIA0.035IN TIP 3CM PTFE NIT

## (undated) DEVICE — ABDOMINAL PAD: Brand: DERMACEA

## (undated) DEVICE — DRAPE,U/ SHT,SPLIT,PLAS,STERIL: Brand: MEDLINE

## (undated) DEVICE — INTRODUCER SHTH 6FR CANN L11CM DIL TIP 35MM GRN TUNGSTEN

## (undated) DEVICE — SUTURE VCRL + SZ 2-0 L18IN ABSRB UD CT1 L36MM 1/2 CIR VCP839D

## (undated) DEVICE — SOLUTION IV IRRIG 500ML 0.9% SODIUM CHL 2F7123

## (undated) DEVICE — MHCZ CYSTO: Brand: MEDLINE INDUSTRIES, INC.

## (undated) DEVICE — FLUID CONTROL SHOULDER PACK: Brand: CONVERTORS

## (undated) DEVICE — 1016 S-DRAPE IRRIG POUCH 10/BOX: Brand: STERI-DRAPE™

## (undated) DEVICE — ELECTRODE PT RET AD L9FT HI MOIST COND ADH HYDRGEL CORDED

## (undated) DEVICE — CATHETER URET 5FR L70CM OPN END SGL LUMN INJ HUB FLEXIMA

## (undated) DEVICE — CANNULA ARTHSCP THRD 55X75 MM W/ OBTURATOR CLR

## (undated) DEVICE — CATHETER,URETHRAL,REDRUBBER,STRL,18FR: Brand: MEDLINE

## (undated) DEVICE — Y-TYPE TUR/BLADDER IRRIGATION SET, REGULATING CLAMP

## (undated) DEVICE — MAJOR SET UP PK

## (undated) DEVICE — GAUZE,SPONGE,4"X4",8PLY,STRL,LF,10/TRAY: Brand: MEDLINE

## (undated) DEVICE — 3M™ COBAN™ NL STERILE NON-LATEX SELF-ADHERENT WRAP, 2084S, 4 IN X 5 YD (10 CM X 4,5 M), 18 ROLLS/CASE: Brand: 3M™ COBAN™

## (undated) DEVICE — TUBING SET SUCTION

## (undated) DEVICE — SUTURE ETHBND EXCEL SZ 0 L18IN NONABSORBABLE GRN L22MM MO-7 CX41D

## (undated) DEVICE — HIGH PRESSURE NEPHROSTOMY BALLOON CATHETER KIT: Brand: NEPHROMAX KIT

## (undated) DEVICE — AMIENT MEGAVAC 90 WAND: Brand: COBLATION

## (undated) DEVICE — FIBER LASER FLEXIVA PULSE ID 365

## (undated) DEVICE — SHUTTLE SUT 90DEG UP CHIA IDEAL

## (undated) DEVICE — BUR SHV L13CM DIA55MM 12 FLUT OVL AGG COOLCUT

## (undated) DEVICE — GLOVE ORANGE PI 7 1/2   MSG9075

## (undated) DEVICE — MEDI-VAC NON-CONDUCTIVE SUCTION TUBING: Brand: CARDINAL HEALTH